# Patient Record
Sex: MALE | Race: BLACK OR AFRICAN AMERICAN | NOT HISPANIC OR LATINO | Employment: FULL TIME | URBAN - METROPOLITAN AREA
[De-identification: names, ages, dates, MRNs, and addresses within clinical notes are randomized per-mention and may not be internally consistent; named-entity substitution may affect disease eponyms.]

---

## 2018-12-10 ENCOUNTER — HOSPITAL ENCOUNTER (EMERGENCY)
Facility: CLINIC | Age: 33
Discharge: HOME | End: 2018-12-10
Attending: EMERGENCY MEDICINE | Admitting: EMERGENCY MEDICINE
Payer: COMMERCIAL

## 2018-12-10 VITALS
WEIGHT: 200.62 LBS | TEMPERATURE: 97.8 F | RESPIRATION RATE: 12 BRPM | DIASTOLIC BLOOD PRESSURE: 70 MMHG | OXYGEN SATURATION: 99 % | HEIGHT: 61 IN | BODY MASS INDEX: 37.88 KG/M2 | HEART RATE: 72 BPM | SYSTOLIC BLOOD PRESSURE: 124 MMHG

## 2018-12-10 DIAGNOSIS — R19.7 DIARRHEA OF PRESUMED INFECTIOUS ORIGIN: ICD-10-CM

## 2018-12-10 DIAGNOSIS — K92.1 BLOOD IN STOOL: ICD-10-CM

## 2018-12-10 LAB
ALBUMIN SERPL-MCNC: 3.8 G/DL (ref 3.4–5)
ALP SERPL-CCNC: 39 U/L (ref 40–150)
ALT SERPL W P-5'-P-CCNC: 45 U/L (ref 0–70)
ANION GAP SERPL CALCULATED.3IONS-SCNC: 3 MMOL/L (ref 3–14)
AST SERPL W P-5'-P-CCNC: 26 U/L (ref 0–45)
BASOPHILS # BLD AUTO: 0 10E9/L (ref 0–0.2)
BASOPHILS NFR BLD AUTO: 0.2 %
BILIRUB SERPL-MCNC: 0.4 MG/DL (ref 0.2–1.3)
BUN SERPL-MCNC: 13 MG/DL (ref 7–30)
CALCIUM SERPL-MCNC: 9.1 MG/DL (ref 8.5–10.1)
CHLORIDE SERPL-SCNC: 105 MMOL/L (ref 94–109)
CO2 SERPL-SCNC: 30 MMOL/L (ref 20–32)
CREAT SERPL-MCNC: 0.85 MG/DL (ref 0.66–1.25)
CRP SERPL-MCNC: <2.9 MG/L (ref 0–8)
DIFFERENTIAL METHOD BLD: NORMAL
EOSINOPHIL # BLD AUTO: 0.1 10E9/L (ref 0–0.7)
EOSINOPHIL NFR BLD AUTO: 2.2 %
ERYTHROCYTE [DISTWIDTH] IN BLOOD BY AUTOMATED COUNT: 13.1 % (ref 10–15)
ERYTHROCYTE [SEDIMENTATION RATE] IN BLOOD BY WESTERGREN METHOD: 8 MM/H (ref 0–15)
GFR SERPL CREATININE-BSD FRML MDRD: >90 ML/MIN/1.7M2
GLUCOSE SERPL-MCNC: 89 MG/DL (ref 70–99)
HCT VFR BLD AUTO: 40.5 % (ref 40–53)
HGB BLD-MCNC: 14 G/DL (ref 13.3–17.7)
IMM GRANULOCYTES # BLD: 0 10E9/L (ref 0–0.4)
IMM GRANULOCYTES NFR BLD: 0 %
LYMPHOCYTES # BLD AUTO: 2.5 10E9/L (ref 0.8–5.3)
LYMPHOCYTES NFR BLD AUTO: 54.9 %
MCH RBC QN AUTO: 29.5 PG (ref 26.5–33)
MCHC RBC AUTO-ENTMCNC: 34.6 G/DL (ref 31.5–36.5)
MCV RBC AUTO: 85 FL (ref 78–100)
MONOCYTES # BLD AUTO: 0.4 10E9/L (ref 0–1.3)
MONOCYTES NFR BLD AUTO: 9.3 %
NEUTROPHILS # BLD AUTO: 1.6 10E9/L (ref 1.6–8.3)
NEUTROPHILS NFR BLD AUTO: 33.4 %
NRBC # BLD AUTO: 0 10*3/UL
NRBC BLD AUTO-RTO: 0 /100
PLATELET # BLD AUTO: 274 10E9/L (ref 150–450)
POTASSIUM SERPL-SCNC: 3.9 MMOL/L (ref 3.4–5.3)
PROT SERPL-MCNC: 7.6 G/DL (ref 6.8–8.8)
RBC # BLD AUTO: 4.74 10E12/L (ref 4.4–5.9)
SODIUM SERPL-SCNC: 138 MMOL/L (ref 133–144)
WBC # BLD AUTO: 4.6 10E9/L (ref 4–11)

## 2018-12-10 PROCEDURE — 85025 COMPLETE CBC W/AUTO DIFF WBC: CPT | Performed by: EMERGENCY MEDICINE

## 2018-12-10 PROCEDURE — 25000128 H RX IP 250 OP 636: Performed by: EMERGENCY MEDICINE

## 2018-12-10 PROCEDURE — 96360 HYDRATION IV INFUSION INIT: CPT

## 2018-12-10 PROCEDURE — 80053 COMPREHEN METABOLIC PANEL: CPT | Performed by: EMERGENCY MEDICINE

## 2018-12-10 PROCEDURE — 85652 RBC SED RATE AUTOMATED: CPT | Performed by: EMERGENCY MEDICINE

## 2018-12-10 PROCEDURE — 99283 EMERGENCY DEPT VISIT LOW MDM: CPT | Mod: 25

## 2018-12-10 PROCEDURE — 86140 C-REACTIVE PROTEIN: CPT | Performed by: EMERGENCY MEDICINE

## 2018-12-10 RX ADMIN — SODIUM CHLORIDE 1000 ML: 9 INJECTION, SOLUTION INTRAVENOUS at 10:44

## 2018-12-10 SDOH — HEALTH STABILITY: MENTAL HEALTH: HOW OFTEN DO YOU HAVE A DRINK CONTAINING ALCOHOL?: NEVER

## 2018-12-10 ASSESSMENT — ENCOUNTER SYMPTOMS
VOMITING: 0
BLOOD IN STOOL: 1
CONSTIPATION: 1
DIFFICULTY URINATING: 0
HEMATURIA: 0
ABDOMINAL PAIN: 1
DIARRHEA: 0
DYSURIA: 0
FEVER: 0

## 2018-12-10 ASSESSMENT — MIFFLIN-ST. JEOR: SCORE: 1728.46

## 2018-12-10 NOTE — DISCHARGE INSTRUCTIONS
Push fluids, diet as tolerated.  Recheck in the clinic with your doctor if you have any further blood in your stool.

## 2018-12-10 NOTE — ED AVS SNAPSHOT
Emergency Department  [address] [facility] [address]  Phone:  [phone]  Fax:  [phone]                                    Wilmer Crenshaw   MRN: [id number]    Department:   Emergency Department   Date of Visit:  12/10/2018           After Visit Summary Signature Page    I have received my discharge instructions, and my questions have been answered. I have discussed any challenges I see with this plan with the nurse or doctor.    ..........................................................................................................................................  Patient/Patient Representative Signature      ..........................................................................................................................................  Patient Representative Print Name and Relationship to Patient    ..................................................               ................................................  Date                                   Time    ..........................................................................................................................................  Reviewed by Signature/Title    ...................................................              ..............................................  Date                                               Time          22EPIC Rev 08/18

## 2018-12-10 NOTE — ED NOTES
Bed: ED06  Expected date:   Expected time:   Means of arrival:   Comments:  Triage mucous stool     Maria De Jesus Sanchez RN  12/10/18 1011

## 2018-12-10 NOTE — ED PROVIDER NOTES
History     Chief Complaint:  Abdominal pain    HPI   Wilmer Crenshaw is a 33 year old male who presents to the emergency department today for evaluation of abdominal pain. Patient states he is a  and was driving back from [place] and started feeling sick Saturday morning. He endorses making a stop at a truck stop in [place] because he had extreme pain in his lower abdomen and stating that he had diarrhea. The patient states he kept on driving and had two more episode of diarrhea with the last episode having more mucous and blood in the stool. He contacted a nurse line and they informed him to go to the ER, however he kept driving until he made it back to [place]. Yesterday, he endorses being home all day and states he now has constipation and slight pain in the abdomen, however he states he is feeling much better. Patient denies current diarrhea, fever, history of abdominal problems, difficulty urinating, dysuria, hematuria, or been around close contact illnesses.    Allergies:  No Known Drug Allergies    Medications:    Medications reviewed. No current medications.     Past Medical History:    Medical history reviewed. No pertinent medical history.    Past Surgical History:    Surgical history reviewed. No pertinent surgical history.    Family History:    Family history reviewed. No pertinent family history.     Social History:  The patient was accompanied to the ED by himself.  Smoking Status: Never Smoker  Smokeless Tobacco: Never Used  Alcohol Use: negative  Marital Status:  Single     Review of Systems   Constitutional: Negative for fever.   Gastrointestinal: Positive for abdominal pain, blood in stool and constipation. Negative for diarrhea and vomiting.   Genitourinary: Negative for difficulty urinating, dysuria and hematuria.   All other systems reviewed and are negative.      Physical Exam     Patient Vitals for the past 24 hrs:   BP Temp Temp src Pulse Heart Rate Resp SpO2 Height Weight   12/10/18  "1216 124/70 -- -- -- 70 12 99 % -- --   12/10/18 1018 129/87 97.8  F (36.6  C) Oral 72 -- 16 100 % 1.558 m (5' 1.32\") 91 kg (200 lb 9.9 oz)       Physical Exam  Nursing note and vitals reviewed.    Constitutional:  Appears well-developed and well-nourished, comfortable.    HENT:    Nose normal.  No discharge.      Oropharynx is clear and moist.  Eyes:    Conjunctivae are normal without injection. No lid droop.     No scleral icterus.  Lymph:  No enlarged or tender cervical or submandibular lymph nodes.   Cardiovascular:  Normal rate, regular rhythm with normal S1 and S2.      Normal heart sounds.       No murmur or stella.  Pulmonary:  Effort normal and breath sounds clear to auscultation bilaterally. No respiratory distress.  No stridor.     No wheezes. No rales.     GI:    Soft. No distension and no mass.  Mild left lower quadrant tenderness without rebound or guarding.     No flank pain.  No HSM.  Musculoskeletal:  Normal range of motion. No extremity deformity.     No edema and no tenderness.    Neurological:   Alert and oriented. No cranial nerve deficit, no facial droop.     Exhibits good muscle tone. Coordination normal.   Skin:    Skin is warm and dry. No rash noted. No diaphoresis.      No erythema. No pallor.  No lesions.  Psychiatric:   Behavior is normal. Appropriate mood and affect.     Judgment and thought content normal.     Emergency Department Course     Laboratory:  Laboratory findings were communicated with the patient who voiced understanding of the findings.    CRP inflammation: <2.9  ESR: 8  CBC: WBC 4.6, HGB 14.0,   CMP: alkaline phos 39(L) o/w WNL (Creatinine 0.85)    Interventions:  1044 NS 1000ml IV    Emergency Department Course:    1017 Nursing notes and vitals reviewed.    1025 I performed an exam of the patient as documented above.     1042 IV was inserted and blood was drawn for laboratory testing, results above.    1200 Recheck and update.    1228 I personally reviewed the lab " results with the patient and answered all related questions prior to discharge.    Impression & Plan      Medical Decision Making:  Wilmer Crenshaw is a 33 year old male who presents to the emergency department today for evaluation of abdominal pain. Patient comes in after having diarrhea for 2 days and some bloody mucus.  It has all resolved in the last 2 days and is feeling much better.  Laboratory work here today is normal including a white count C-reactive protein sed rate and electrolytes.  I believe he had a viral colitis and it has resolved.  If he notices further blood in his stool he knows to follow-up in the clinic for colonoscopy.  Otherwise follow-up as needed.    Diagnosis:    ICD-10-CM    1. Diarrhea of presumed infectious origin R19.7    2. Blood in stool K92.1     Likely a colitis, resolved     Disposition:   The patient is discharged to home. Push fluids, diet as tolerated.  Recheck in the clinic with your doctor if you have any further blood in your stool.    Discharge Medications:  No discharge medications.    Scribe Disclosure:  I, Lulu Truong, am serving as a scribe at 1:23 PM on 12/10/2018 to document services personally performed by No att. providers found based on my observations and the provider's statements to me.      EMERGENCY DEPARTMENT       Caroline Gibson MD  12/10/18 6736

## 2019-01-20 ENCOUNTER — HOSPITAL ENCOUNTER (EMERGENCY)
Facility: CLINIC | Age: 34
Discharge: HOME | End: 2019-01-20
Attending: EMERGENCY MEDICINE | Admitting: EMERGENCY MEDICINE
Payer: COMMERCIAL

## 2019-01-20 ENCOUNTER — APPOINTMENT (OUTPATIENT)
Dept: CT IMAGING | Facility: CLINIC | Age: 34
End: 2019-01-20
Payer: COMMERCIAL

## 2019-01-20 VITALS
DIASTOLIC BLOOD PRESSURE: 66 MMHG | SYSTOLIC BLOOD PRESSURE: 128 MMHG | OXYGEN SATURATION: 99 % | HEART RATE: 89 BPM | TEMPERATURE: 98.7 F | RESPIRATION RATE: 12 BRPM

## 2019-01-20 DIAGNOSIS — B34.9 VIRAL SYNDROME: ICD-10-CM

## 2019-01-20 LAB
ALBUMIN SERPL-MCNC: 3.7 G/DL (ref 3.4–5)
ALBUMIN UR-MCNC: NEGATIVE MG/DL
ALP SERPL-CCNC: 42 U/L (ref 40–150)
ALT SERPL W P-5'-P-CCNC: 38 U/L (ref 0–70)
ANION GAP SERPL CALCULATED.3IONS-SCNC: 8 MMOL/L (ref 3–14)
APPEARANCE UR: CLEAR
AST SERPL W P-5'-P-CCNC: 25 U/L (ref 0–45)
BASOPHILS # BLD AUTO: 0 10E9/L (ref 0–0.2)
BASOPHILS NFR BLD AUTO: 0.1 %
BILIRUB SERPL-MCNC: 0.4 MG/DL (ref 0.2–1.3)
BILIRUB UR QL STRIP: NEGATIVE
BUN SERPL-MCNC: 12 MG/DL (ref 7–30)
CALCIUM SERPL-MCNC: 9 MG/DL (ref 8.5–10.1)
CHLORIDE SERPL-SCNC: 103 MMOL/L (ref 94–109)
CO2 SERPL-SCNC: 26 MMOL/L (ref 20–32)
COLOR UR AUTO: YELLOW
CREAT SERPL-MCNC: 1.11 MG/DL (ref 0.66–1.25)
DIFFERENTIAL METHOD BLD: NORMAL
EOSINOPHIL # BLD AUTO: 0 10E9/L (ref 0–0.7)
EOSINOPHIL NFR BLD AUTO: 0.1 %
ERYTHROCYTE [DISTWIDTH] IN BLOOD BY AUTOMATED COUNT: 13.3 % (ref 10–15)
GFR SERPL CREATININE-BSD FRML MDRD: 87 ML/MIN/{1.73_M2}
GLUCOSE SERPL-MCNC: 96 MG/DL (ref 70–99)
GLUCOSE UR STRIP-MCNC: NEGATIVE MG/DL
HCT VFR BLD AUTO: 40.6 % (ref 40–53)
HGB BLD-MCNC: 14.1 G/DL (ref 13.3–17.7)
HGB UR QL STRIP: NEGATIVE
IMM GRANULOCYTES # BLD: 0 10E9/L (ref 0–0.4)
IMM GRANULOCYTES NFR BLD: 0.4 %
KETONES UR STRIP-MCNC: NEGATIVE MG/DL
LEUKOCYTE ESTERASE UR QL STRIP: NEGATIVE
LIPASE SERPL-CCNC: 105 U/L (ref 73–393)
LYMPHOCYTES # BLD AUTO: 1.5 10E9/L (ref 0.8–5.3)
LYMPHOCYTES NFR BLD AUTO: 20.2 %
MCH RBC QN AUTO: 29.5 PG (ref 26.5–33)
MCHC RBC AUTO-ENTMCNC: 34.7 G/DL (ref 31.5–36.5)
MCV RBC AUTO: 85 FL (ref 78–100)
MONOCYTES # BLD AUTO: 0.5 10E9/L (ref 0–1.3)
MONOCYTES NFR BLD AUTO: 6.2 %
MUCOUS THREADS #/AREA URNS LPF: PRESENT /LPF
NEUTROPHILS # BLD AUTO: 5.4 10E9/L (ref 1.6–8.3)
NEUTROPHILS NFR BLD AUTO: 73 %
NITRATE UR QL: NEGATIVE
NRBC # BLD AUTO: 0 10*3/UL
NRBC BLD AUTO-RTO: 0 /100
PH UR STRIP: 5.5 PH (ref 5–7)
PLATELET # BLD AUTO: 243 10E9/L (ref 150–450)
POTASSIUM SERPL-SCNC: 3.8 MMOL/L (ref 3.4–5.3)
PROT SERPL-MCNC: 7.7 G/DL (ref 6.8–8.8)
RBC # BLD AUTO: 4.78 10E12/L (ref 4.4–5.9)
RBC #/AREA URNS AUTO: <1 /HPF (ref 0–2)
SODIUM SERPL-SCNC: 137 MMOL/L (ref 133–144)
SOURCE: ABNORMAL
SP GR UR STRIP: 1.01 (ref 1–1.03)
UROBILINOGEN UR STRIP-MCNC: NORMAL MG/DL (ref 0–2)
WBC # BLD AUTO: 7.4 10E9/L (ref 4–11)
WBC #/AREA URNS AUTO: 1 /HPF (ref 0–5)

## 2019-01-20 PROCEDURE — 96361 HYDRATE IV INFUSION ADD-ON: CPT

## 2019-01-20 PROCEDURE — 80053 COMPREHEN METABOLIC PANEL: CPT | Performed by: EMERGENCY MEDICINE

## 2019-01-20 PROCEDURE — 83690 ASSAY OF LIPASE: CPT | Performed by: EMERGENCY MEDICINE

## 2019-01-20 PROCEDURE — 96374 THER/PROPH/DIAG INJ IV PUSH: CPT

## 2019-01-20 PROCEDURE — 81001 URINALYSIS AUTO W/SCOPE: CPT | Performed by: EMERGENCY MEDICINE

## 2019-01-20 PROCEDURE — 25000125 ZZHC RX 250: Performed by: EMERGENCY MEDICINE

## 2019-01-20 PROCEDURE — 85025 COMPLETE CBC W/AUTO DIFF WBC: CPT | Performed by: EMERGENCY MEDICINE

## 2019-01-20 PROCEDURE — 99285 EMERGENCY DEPT VISIT HI MDM: CPT | Mod: 25

## 2019-01-20 PROCEDURE — 25000128 H RX IP 250 OP 636: Performed by: EMERGENCY MEDICINE

## 2019-01-20 PROCEDURE — 74177 CT ABD & PELVIS W/CONTRAST: CPT

## 2019-01-20 RX ORDER — KETOROLAC TROMETHAMINE 30 MG/ML
30 INJECTION, SOLUTION INTRAMUSCULAR; INTRAVENOUS ONCE
Status: COMPLETED | OUTPATIENT
Start: 2019-01-20 | End: 2019-01-20

## 2019-01-20 RX ORDER — IOPAMIDOL 755 MG/ML
101 INJECTION, SOLUTION INTRAVASCULAR ONCE
Status: COMPLETED | OUTPATIENT
Start: 2019-01-20 | End: 2019-01-20

## 2019-01-20 RX ORDER — SODIUM CHLORIDE 9 MG/ML
INJECTION, SOLUTION INTRAVENOUS CONTINUOUS
Status: DISCONTINUED | OUTPATIENT
Start: 2019-01-20 | End: 2019-01-20 | Stop reason: HOSPADM

## 2019-01-20 RX ADMIN — SODIUM CHLORIDE 1000 ML: 9 INJECTION, SOLUTION INTRAVENOUS at 08:25

## 2019-01-20 RX ADMIN — SODIUM CHLORIDE 1000 ML: 9 INJECTION, SOLUTION INTRAVENOUS at 08:26

## 2019-01-20 RX ADMIN — IOPAMIDOL 101 ML: 755 INJECTION, SOLUTION INTRAVENOUS at 10:36

## 2019-01-20 RX ADMIN — KETOROLAC TROMETHAMINE 30 MG: 30 INJECTION, SOLUTION INTRAMUSCULAR at 11:36

## 2019-01-20 RX ADMIN — SODIUM CHLORIDE 71 ML: 9 INJECTION, SOLUTION INTRAVENOUS at 10:37

## 2019-01-20 ASSESSMENT — ENCOUNTER SYMPTOMS
SORE THROAT: 0
RHINORRHEA: 0
FREQUENCY: 0
DYSURIA: 0
NAUSEA: 1
HEADACHES: 0
COUGH: 0
APPETITE CHANGE: 1
MYALGIAS: 1
FEVER: 1
ABDOMINAL PAIN: 1

## 2019-01-20 NOTE — ED AVS SNAPSHOT
Emergency Department  [address] [facility] [address]  Phone:  [phone]  Fax:  [phone]                                    Wilmer Crenshaw   MRN: [id number]    Department:   Emergency Department   Date of Visit:  1/20/2019           After Visit Summary Signature Page    I have received my discharge instructions, and my questions have been answered. I have discussed any challenges I see with this plan with the nurse or doctor.    ..........................................................................................................................................  Patient/Patient Representative Signature      ..........................................................................................................................................  Patient Representative Print Name and Relationship to Patient    ..................................................               ................................................  Date                                   Time    ..........................................................................................................................................  Reviewed by Signature/Title    ...................................................              ..............................................  Date                                               Time          22EPIC Rev 08/18

## 2019-01-20 NOTE — ED PROVIDER NOTES
History     Chief Complaint:  Abdominal pain     HPI:   The history is provided by the patient.      Wilmer Crenshaw is a 34 year old male who presents to the emergency department for evaluation of abdominal pain. The patient reports that he developed swollen lymph nodes last week that have gradually decreased in size and tenderness. Yesterday afternoon, he developed body aches and the sensation of a fever (though no recorded temp). He then developed diffuse abdominal pain with bowel movements as well as nausea and rib pain. He took 2 Ibuprofen around 1700 yesterday. He had no appetite but was able to force himself to eat. He presents to the emergency department concerned for his symptoms. Here, the patient rates his abdominal pain a 6/10 in severity. He does not currently have nausea. He denies any dysuria, frequency, rashes, cough, runny nose, sore throat, leg swelling, ear pain, headache, or visual disturbance. He does note that a friend that he was recently with is also no feeling well. He has had no recent travel outside the country, though does note that he is a . He has no medical or surgical history.     Allergies:  No known drug allergies.    Medications:    The patient is not currently taking any prescribed medications.     Past Medical History:    Denies any medical history     Past Surgical History:    Denies any surgical history     Family History:    Noncontributory     Social History:  Presents alone    Tobacco use: Never smoker   Alcohol use: No  Marital status: Single      Review of Systems   Constitutional: Positive for appetite change and fever (subjective).   HENT: Negative for ear pain, rhinorrhea and sore throat.    Eyes: Negative for visual disturbance.   Respiratory: Negative for cough.    Cardiovascular: Negative for leg swelling.   Gastrointestinal: Positive for abdominal pain and nausea (resolved).   Genitourinary: Negative for dysuria and frequency.   Musculoskeletal: Positive  for myalgias.   Skin: Negative for rash.   Neurological: Negative for headaches.   All other systems reviewed and are negative.      Physical Exam     Patient Vitals for the past 24 hrs:   BP Temp Temp src Pulse Resp SpO2   01/20/19 1119 -- 98.7  F (37.1  C) Oral -- 12 --   01/20/19 1117 128/66 -- -- 89 -- --   01/20/19 0918 -- 100.3  F (37.9  C) Oral -- -- --   01/20/19 0747 119/67 -- -- 117 16 99 %   01/20/19 0745 -- 99.2  F (37.3  C) Oral -- -- --        Physical Exam  Constitutional: Well developed, nontox appearance  Head: Atraumatic.   Mouth/Throat: Oropharynx is clear and moist.   Neck:  no stridor, full ROM, no LAD, no meningismus  Eyes: no scleral icterus  Cardiovascular: Regular tachycardia, 2+ bilat radial pulses  Pulmonary/Chest: nml resp effort, Clear BS bilat  Abdominal: ND, +BS, soft, LLQ, no rebound or guarding   : no CVA tenderness bilat  Ext: Warm, well perfused, no edema  Neurological: A&O, symmetric facies, moves ext x4  Skin: Skin is warm and dry.   Psychiatric: Behavior is normal. Thought content normal.   Nursing note and vitals reviewed.    Emergency Department Course     Imaging:  Radiographic findings were communicated with the patient who voiced understanding of the findings.     CT Abd/pelvis with contrast:  IMPRESSION: Diffuse colonic wall prominence. This could be related to  relative decompression. However, although there is no pericolonic  inflammatory stranding, diffuse colitis cannot be excluded.    Imaging independently reviewed and agree with radiologist interpretation.      Laboratory:  I personally reviewed the laboratory results with the Patient and answered all related questions prior to discharge.  CBC: WNL (WBC 7.4, HGB 14.1, )   CMP: (Creatinine 1.11)   Lipase: 105   UA with Microscopic: Mucous present, ow Negative     Interventions:  0826: NS 1L IV Bolus    1136: Toradol 30 mg IV       Emergency Department Course:  Past medical records, nursing notes, and vitals  reviewed.  0748: I performed an exam of the patient and obtained history, as documented above.     IV inserted and blood drawn. This was sent to the lab for further testing, results above.   Above interventions provided.      The patient was sent for a CT while in the emergency department, findings above.      1148: I rechecked the patient. Findings and plan explained to the Patient. Patient discharged home with instructions regarding supportive care, medications, and reasons to return. The importance of close follow-up was reviewed.       Impression & Plan      Medical Decision Makin year old male presenting w/ body aches, resolved lymphadenopathy, transient nausea, left lower quadrant tenderness     DDx includes viral syndrome, diverticulitis, colitis, constipation, electrolyte abnormality.  Doubt meningitis, bowel obstruction, urinary tract infection given symptomatology, physical exam, history.  Labs and imaging ordered as noted above.  Labs unremarkable.  Imaging sig for colonic wall prominence.  Medications given as noted above with improvement in symptoms and resolution of tachycardia.  Pt likely experiencing viral syndrome.  At this time I feel he is safe for discharge.  Recommendations given regarding follow up with primary care doctor and return to the emergency department as needed for new or worsening symptoms.  Counseled on all results, disposition and diagnosis.  Patient understanding and agreeable to plan. Patient discharged in stable condition.    Diagnosis:    ICD-10-CM    1. Viral syndrome B34.9        Disposition:  Discharged to home with plan as outlined.     Scribe Disclosure:   Perry GREENWOOD, am serving as a scribe at 7:48 AM on 2019 to document services personally performed by Edgardo Stark MD based on my observations and the provider's statements to me.       Edgardo Stark MD  2019    EMERGENCY DEPARTMENT       Edgardo Stark MD  19  0056

## 2019-01-20 NOTE — DISCHARGE INSTRUCTIONS
1. -Take acetaminophen 500 to 1000 mg by mouth every 4 to 6 hours as needed for pain or fever.  Do not take more than 4000 mg in 24 hours.  Do not take within 6 hours of another acetaminophen containing medication such as norco (vicodin) or percocet.  - Take ibuprofen 600 to 800 mg by mouth every 6 to 8 hours as needed for pain or fever  2.  Please drink plenty of fluids.  3.  Please follow-up with your primary doctor as needed.  4.  Please return to the emergency department as needed for new or worsening symptoms including vomiting and unable to keep anything down, severe and uncontrollable pain, severe confusion, severe neck pain and stiffness, shortness of breath, fainting, any other concerning symptoms.

## 2019-01-23 ENCOUNTER — NURSE TRIAGE (OUTPATIENT)
Dept: NURSING | Facility: CLINIC | Age: 34
End: 2019-01-23

## 2019-01-23 NOTE — TELEPHONE ENCOUNTER
Soft transfer to central scheduling to set up appointment for tomorrow at [facility].  [facility]

## 2019-01-24 ENCOUNTER — OFFICE VISIT (OUTPATIENT)
Dept: FAMILY MEDICINE | Facility: CLINIC | Age: 34
End: 2019-01-24
Payer: COMMERCIAL

## 2019-01-24 VITALS
HEART RATE: 73 BPM | BODY MASS INDEX: 37 KG/M2 | WEIGHT: 196 LBS | HEIGHT: 61 IN | SYSTOLIC BLOOD PRESSURE: 108 MMHG | OXYGEN SATURATION: 97 % | TEMPERATURE: 97.1 F | DIASTOLIC BLOOD PRESSURE: 73 MMHG

## 2019-01-24 DIAGNOSIS — K29.50 CHRONIC GASTRITIS WITHOUT BLEEDING, UNSPECIFIED GASTRITIS TYPE: ICD-10-CM

## 2019-01-24 DIAGNOSIS — R10.84 ABDOMINAL PAIN, GENERALIZED: Primary | ICD-10-CM

## 2019-01-24 DIAGNOSIS — K52.9 GASTROENTERITIS: ICD-10-CM

## 2019-01-24 PROCEDURE — 99204 OFFICE O/P NEW MOD 45 MIN: CPT | Performed by: INTERNAL MEDICINE

## 2019-01-24 ASSESSMENT — MIFFLIN-ST. JEOR: SCORE: 1702.51

## 2019-01-24 NOTE — PROGRESS NOTES
SUBJECTIVE:   Wilmer Crenshaw is a 34 year old male who presents to clinic today for the following health issues:      New Patient/Transfer of Care    ED/UC Followup:    Facility:   Emergency Department  Date of visit: 01/20/2019  Reason for visit: Viral syndrome, Abdominal Pain  Current Status: Patient state that he still having abdominal pain off and on.         HPI:   Patient Wilmer Crenshaw is a very pleasant 34 year old male with history of gastritis who presents to Internal Medicine clinic today for evaluation of recent ER visit of diffuse abdominal pains and gastroenteritis symptoms. Regarding the patient's ER evaluation, the patient's WBC was negative for leukocytosis.  His abdominal CT imaging study obtained at the ER shows no appendicitis or gallstones. The CT scan did show Diffuse colonic wall prominence. This could be related to relative decompression. However, although there is no pericolonic inflammatory stranding, diffuse colitis. Regarding the patient's chronic gastritis, the patient is not on any anti-acid medication at this time. He is concerned about possible H. Pylori infection of the stomach. He denies any acute fever or chills at this time. He still have some occasional diarrhea, loose stools since ER discharge.         Current Medications:     No current outpatient medications on file.         Allergies:      Allergies   Allergen Reactions     Seasonal Allergies             Past Medical History:     Past Medical History:   Diagnosis Date     Gastritis          Past Surgical History:   No past surgical history on file.      Family Medical History:     Family History   Problem Relation Age of Onset     Family History Negative Mother      Family History Negative Father          Social History:     Social History     Socioeconomic History     Marital status: Single     Spouse name: Not on file     Number of children: Not on file     Years of education: Not on file     Highest education level: Not on  "file   Social Needs     Financial resource strain: Not on file     Food insecurity - worry: Not on file     Food insecurity - inability: Not on file     Transportation needs - medical: Not on file     Transportation needs - non-medical: Not on file   Occupational History     Not on file   Tobacco Use     Smoking status: Never Smoker     Smokeless tobacco: Never Used   Substance and Sexual Activity     Alcohol use: No     Frequency: Never     Drug use: No     Sexual activity: Yes     Partners: Female   Other Topics Concern     Not on file   Social History Narrative     Not on file           Review of System:     Constitutional: Negative for fever or chills  Skin: Negative for rashes  Ears/Nose/Throat: Negative for nasal congestion, sore throat  Respiratory: No shortness of breath, dyspnea on exertion, cough, or hemoptysis  Cardiovascular: Negative for chest pain  Gastrointestinal: Positive for abdominal pains, diarrhea, gastritis  Genitourinary: Negative for dysuria, hematuria  Musculoskeletal: Negative for myalgias  Neurologic: Negative for headaches  Psychiatric: Negative for depression, anxiety  Hematologic/Lymphatic/Immunologic: Negative  Endocrine: Negative  Behavioral: Negative for tobacco use       Physical Exam:   /73 (BP Location: Left arm, Patient Position: Sitting, Cuff Size: Adult Large)   Pulse 73   Temp 97.1  F (36.2  C) (Oral)   Ht 1.558 m (5' 1.32\")   Wt 88.9 kg (196 lb)   SpO2 97%   BMI 36.65 kg/m      GENERAL: alert and no distress  EYES: eyes grossly normal to inspection, and conjunctivae and sclerae normal  HENT: Normocephalic atraumatic. Nose and mouth without ulcers or lesions  NECK: supple  RESP: lungs clear to auscultation   CV: regular rate and rhythm, normal S1 S2  LYMPH: no peripheral edema   ABDOMEN: nondistended, non-tender to palpation, no rebound or guarding  MS: no gross musculoskeletal defects noted  SKIN: no suspicious lesions or rashes  NEURO: Alert & Oriented x 3. "   PSYCH: mentation appears normal, affect normal        Diagnostic Test Results:     Diagnostic Test Results:  Results for orders placed or performed during the hospital encounter of 01/20/19   CT Abdomen Pelvis w Contrast    Narrative    CT ABDOMEN AND PELVIS WITH CONTRAST 1/20/2019 10:38 AM     HISTORY: Focal left lower quadrant tenderness and tachy, evaluate  diverticulitis, other.    CONTRAST DOSE:  101 mL Isovue-370    Radiation dose for this scan was reduced using automated exposure  control, adjustment of the mA and/or kV according to patient size, or  iterative reconstruction technique.    FINDINGS:  The colonic wall is diffusely prominent from the cecum to  the rectum. The colon is diffusely decompressed. No pericolonic  inflammatory stranding is noted. There is no evidence of bowel  obstruction. A normal appendix is noted. There is no free peritoneal  fluid or air. The liver, spleen, adrenal glands, kidneys, pancreas,  and gallbladder appear within normal limits. Pelvic contents are  otherwise unremarkable.      Impression    IMPRESSION: Diffuse colonic wall prominence. This could be related to  relative decompression. However, although there is no pericolonic  inflammatory stranding, diffuse colitis cannot be excluded.    ARIC PAIGE MD   CBC with platelets differential   Result Value Ref Range    WBC 7.4 4.0 - 11.0 10e9/L    RBC Count 4.78 4.4 - 5.9 10e12/L    Hemoglobin 14.1 13.3 - 17.7 g/dL    Hematocrit 40.6 40.0 - 53.0 %    MCV 85 78 - 100 fl    MCH 29.5 26.5 - 33.0 pg    MCHC 34.7 31.5 - 36.5 g/dL    RDW 13.3 10.0 - 15.0 %    Platelet Count 243 150 - 450 10e9/L    Diff Method Automated Method     % Neutrophils 73.0 %    % Lymphocytes 20.2 %    % Monocytes 6.2 %    % Eosinophils 0.1 %    % Basophils 0.1 %    % Immature Granulocytes 0.4 %    Nucleated RBCs 0 0 /100    Absolute Neutrophil 5.4 1.6 - 8.3 10e9/L    Absolute Lymphocytes 1.5 0.8 - 5.3 10e9/L    Absolute Monocytes 0.5 0.0 - 1.3 10e9/L     Absolute Eosinophils 0.0 0.0 - 0.7 10e9/L    Absolute Basophils 0.0 0.0 - 0.2 10e9/L    Abs Immature Granulocytes 0.0 0 - 0.4 10e9/L    Absolute Nucleated RBC 0.0    Comprehensive metabolic panel   Result Value Ref Range    Sodium 137 133 - 144 mmol/L    Potassium 3.8 3.4 - 5.3 mmol/L    Chloride 103 94 - 109 mmol/L    Carbon Dioxide 26 20 - 32 mmol/L    Anion Gap 8 3 - 14 mmol/L    Glucose 96 70 - 99 mg/dL    Urea Nitrogen 12 7 - 30 mg/dL    Creatinine 1.11 0.66 - 1.25 mg/dL    GFR Estimate 87 >60 mL/min/[1.73_m2]    GFR Estimate If Black >90 >60 mL/min/[1.73_m2]    Calcium 9.0 8.5 - 10.1 mg/dL    Bilirubin Total 0.4 0.2 - 1.3 mg/dL    Albumin 3.7 3.4 - 5.0 g/dL    Protein Total 7.7 6.8 - 8.8 g/dL    Alkaline Phosphatase 42 40 - 150 U/L    ALT 38 0 - 70 U/L    AST 25 0 - 45 U/L   UA with Microscopic   Result Value Ref Range    Color Urine Yellow     Appearance Urine Clear     Glucose Urine Negative NEG^Negative mg/dL    Bilirubin Urine Negative NEG^Negative    Ketones Urine Negative NEG^Negative mg/dL    Specific Gravity Urine 1.010 1.003 - 1.035    Blood Urine Negative NEG^Negative    pH Urine 5.5 5.0 - 7.0 pH    Protein Albumin Urine Negative NEG^Negative mg/dL    Urobilinogen mg/dL Normal 0.0 - 2.0 mg/dL    Nitrite Urine Negative NEG^Negative    Leukocyte Esterase Urine Negative NEG^Negative    Source Midstream Urine     WBC Urine 1 0 - 5 /HPF    RBC Urine <1 0 - 2 /HPF    Mucous Urine Present (A) NEG^Negative /LPF   Lipase   Result Value Ref Range    Lipase 105 73 - 393 U/L       ASSESSMENT/PLAN:     (K29.50) Chronic gastritis without bleeding, unspecified gastritis type  (K52.9) Gastroenteritis  (R10.84) Abdominal pain, generalized  (primary encounter diagnosis)  Comment: post ER discharge follow up of recent gastroenteritis. Symptoms concerning for possible crohn's disease, ulcerative colitis vs. H. Pylori infection. No bloody stools at this time.  Plan: I have ordered diagnostic upper EGD endoscopy and  colonoscopy with GASTROENTEROLOGY ADULT REF PROCEDURE ONLY         Carlos Frazier [phone]; No Provider Preference, I have also prescribed ranitidine (ZANTAC) 150 MG capsule 2xDay for gastritis treatment going forward.    Follow Up Plan:     Patient is instructed to return to Internal Medicine clinic for follow-up visit in 1 week.        Kylah Sol MD  Internal Medicine  [facility]

## 2019-01-28 ENCOUNTER — HOSPITAL ENCOUNTER (OUTPATIENT)
Facility: CLINIC | Age: 34
End: 2019-01-28
Attending: INTERNAL MEDICINE | Admitting: INTERNAL MEDICINE

## 2019-06-13 ENCOUNTER — OFFICE VISIT (OUTPATIENT)
Dept: FAMILY MEDICINE | Facility: CLINIC | Age: 34
End: 2019-06-13
Payer: COMMERCIAL

## 2019-06-13 VITALS
DIASTOLIC BLOOD PRESSURE: 80 MMHG | HEART RATE: 77 BPM | SYSTOLIC BLOOD PRESSURE: 124 MMHG | HEIGHT: 72 IN | TEMPERATURE: 97.4 F | BODY MASS INDEX: 26.55 KG/M2 | WEIGHT: 196 LBS | OXYGEN SATURATION: 98 %

## 2019-06-13 DIAGNOSIS — Z00.00 ROUTINE GENERAL MEDICAL EXAMINATION AT A HEALTH CARE FACILITY: Primary | ICD-10-CM

## 2019-06-13 DIAGNOSIS — Z13.6 CARDIOVASCULAR SCREENING; LDL GOAL LESS THAN 160: ICD-10-CM

## 2019-06-13 DIAGNOSIS — Z11.3 SCREEN FOR STD (SEXUALLY TRANSMITTED DISEASE): ICD-10-CM

## 2019-06-13 DIAGNOSIS — J34.2 DEVIATED NASAL SEPTUM: ICD-10-CM

## 2019-06-13 PROCEDURE — 99395 PREV VISIT EST AGE 18-39: CPT | Performed by: FAMILY MEDICINE

## 2019-06-13 ASSESSMENT — MIFFLIN-ST. JEOR: SCORE: 1866.56

## 2019-06-13 NOTE — PROGRESS NOTES
SUBJECTIVE:   CC: Wilmer Crenshaw is an 34 year old male who presents for preventive health visit.     Healthy Habits:    Do you get at least three servings of calcium containing foods daily (dairy, green leafy vegetables, etc.)? NO    Amount of exercise or daily activities, outside of work: NONE    Problems taking medications regularly not applicable    Medication side effects: No    Have you had an eye exam in the past two years? no    Do you see a dentist twice per year? yes    Do you have sleep apnea, excessive snoring or daytime drowsiness?no      Patient overall very healthy.  Denies any chest pain shortness of breath.  Currently working as a .  Complains of on and off nasal congestion symptoms, had some trauma while he was child playing outside.    Today's PHQ-2 Score:   PHQ-2 ( 1999 [employer]) 6/13/2019 1/24/2019   Q1: Little interest or pleasure in doing things 0 0   Q2: Feeling down, depressed or hopeless 0 0   PHQ-2 Score 0 0       Abuse: Current or Past(Physical, Sexual or Emotional)- No  Do you feel safe in your environment? Yes    Social History     Tobacco Use     Smoking status: Never Smoker     Smokeless tobacco: Never Used   Substance Use Topics     Alcohol use: No     Frequency: Never     If you drink alcohol do you typically have >3 drinks per day or >7 drinks per week? No                      Last PSA: No results found for: PSA    Reviewed orders with patient. Reviewed health maintenance and updated orders accordingly - Yes  Lab work is in process  Labs reviewed in EPIC    Reviewed and updated as needed this visit by clinical staff  Tobacco  Allergies  Meds  Fam Hx  Soc Hx        Reviewed and updated as needed this visit by Provider            ROS:  CONSTITUTIONAL: NEGATIVE for fever, chills, change in weight  INTEGUMENTARY/SKIN: NEGATIVE for worrisome rashes, moles or lesions  EYES: NEGATIVE for vision changes or irritation  ENT: NEGATIVE for ear, mouth and throat problems  RESP:  "NEGATIVE for significant cough or SOB  CV: NEGATIVE for chest pain, palpitations or peripheral edema  GI: NEGATIVE for nausea, abdominal pain, heartburn, or change in bowel habits   male: negative for dysuria, hematuria, decreased urinary stream, erectile dysfunction, urethral discharge  MUSCULOSKELETAL: NEGATIVE for significant arthralgias or myalgia  NEURO: NEGATIVE for weakness, dizziness or paresthesias  PSYCHIATRIC: NEGATIVE for changes in mood or affect    OBJECTIVE:   /80   Pulse 77   Temp 97.4  F (36.3  C) (Tympanic)   Ht 1.82 m (5' 11.65\")   Wt 88.9 kg (196 lb)   SpO2 98%   BMI 26.84 kg/m    EXAM:  GENERAL: healthy, alert and no distress  EYES: Eyes grossly normal to inspection, PERRL and conjunctivae and sclerae normal  HENT: ear canals and TM's normal, nose and mouth without ulcers or lesions  NECK: no adenopathy, no asymmetry, masses, or scars and thyroid normal to palpation  RESP: lungs clear to auscultation - no rales, rhonchi or wheezes  CV: regular rate and rhythm, normal S1 S2, no S3 or S4, no murmur, click or rub, no peripheral edema and peripheral pulses strong  ABDOMEN: soft, nontender, no hepatosplenomegaly, no masses and bowel sounds normal  MS: no gross musculoskeletal defects noted, no edema  SKIN: no suspicious lesions or rashes  NEURO: Normal strength and tone, mentation intact and speech normal  PSYCH: mentation appears normal, affect normal/bright    Diagnostic Test Results:  Labs reviewed in Epic    ASSESSMENT/PLAN:   1. Routine general medical examination at a health care facility    - HIV Screening; Future  - Lipid panel reflex to direct LDL Fasting; Future  - Comprehensive metabolic panel; Future    2. CARDIOVASCULAR SCREENING; LDL GOAL LESS THAN 160  id panel reflex to direct LDL Fasting; Future  - Comprehensive metabolic panel; Future    3. Deviated nasal septum  Patient most likely has deviated nasal septum.  Suggested ENT evaluation to see if any surgery needs to be " "done.  He was told in the past at [facility], he may need surgery in future.  - OTOLARYNGOLOGY REFERRAL    4. Screen for STD (sexually transmitted disease)    - NEISSERIA GONORRHOEA PCR  - CHLAMYDIA TRACHOMATIS PCR    COUNSELING:  Reviewed preventive health counseling, as reflected in patient instructions       Regular exercise       Healthy diet/nutrition    Estimated body mass index is 26.84 kg/m  as calculated from the following:    Height as of this encounter: 1.82 m (5' 11.65\").    Weight as of this encounter: 88.9 kg (196 lb).         reports that he has never smoked. He has never used smokeless tobacco.      Counseling Resources:  ATP IV Guidelines  Pooled Cohorts Equation Calculator  FRAX Risk Assessment  ICSI Preventive Guidelines  Dietary Guidelines for Americans, 2010  USDA's MyPlate  ASA Prophylaxis  Lung CA Screening    Phillip Harper MD  [facility] [place]  "

## 2019-06-14 DIAGNOSIS — Z00.00 ROUTINE GENERAL MEDICAL EXAMINATION AT A HEALTH CARE FACILITY: ICD-10-CM

## 2019-06-14 DIAGNOSIS — Z13.6 CARDIOVASCULAR SCREENING; LDL GOAL LESS THAN 160: ICD-10-CM

## 2019-06-14 DIAGNOSIS — Z11.3 SCREEN FOR STD (SEXUALLY TRANSMITTED DISEASE): ICD-10-CM

## 2019-06-14 LAB
ALBUMIN SERPL-MCNC: 4.2 G/DL (ref 3.4–5)
ALP SERPL-CCNC: 57 U/L (ref 40–150)
ALT SERPL W P-5'-P-CCNC: 49 U/L (ref 0–70)
ANION GAP SERPL CALCULATED.3IONS-SCNC: 7 MMOL/L (ref 3–14)
AST SERPL W P-5'-P-CCNC: 31 U/L (ref 0–45)
BILIRUB SERPL-MCNC: 0.2 MG/DL (ref 0.2–1.3)
BUN SERPL-MCNC: 11 MG/DL (ref 7–30)
CALCIUM SERPL-MCNC: 9.2 MG/DL (ref 8.5–10.1)
CHLORIDE SERPL-SCNC: 106 MMOL/L (ref 94–109)
CHOLEST SERPL-MCNC: 228 MG/DL
CO2 SERPL-SCNC: 28 MMOL/L (ref 20–32)
CREAT SERPL-MCNC: 0.92 MG/DL (ref 0.66–1.25)
GFR SERPL CREATININE-BSD FRML MDRD: >90 ML/MIN/{1.73_M2}
GLUCOSE SERPL-MCNC: 114 MG/DL (ref 70–99)
HDLC SERPL-MCNC: 47 MG/DL
LDLC SERPL CALC-MCNC: 147 MG/DL
NONHDLC SERPL-MCNC: 181 MG/DL
POTASSIUM SERPL-SCNC: 4.6 MMOL/L (ref 3.4–5.3)
PROT SERPL-MCNC: 7.8 G/DL (ref 6.8–8.8)
SODIUM SERPL-SCNC: 141 MMOL/L (ref 133–144)
TRIGL SERPL-MCNC: 168 MG/DL

## 2019-06-14 PROCEDURE — 80061 LIPID PANEL: CPT | Performed by: FAMILY MEDICINE

## 2019-06-14 PROCEDURE — 36415 COLL VENOUS BLD VENIPUNCTURE: CPT | Performed by: FAMILY MEDICINE

## 2019-06-14 PROCEDURE — 80053 COMPREHEN METABOLIC PANEL: CPT | Performed by: FAMILY MEDICINE

## 2019-06-14 PROCEDURE — 87591 N.GONORRHOEAE DNA AMP PROB: CPT | Performed by: FAMILY MEDICINE

## 2019-06-14 PROCEDURE — 87491 CHLMYD TRACH DNA AMP PROBE: CPT | Performed by: FAMILY MEDICINE

## 2019-06-14 PROCEDURE — 87389 HIV-1 AG W/HIV-1&-2 AB AG IA: CPT | Performed by: FAMILY MEDICINE

## 2019-06-16 LAB
C TRACH DNA SPEC QL NAA+PROBE: NEGATIVE
N GONORRHOEA DNA SPEC QL NAA+PROBE: NEGATIVE
SPECIMEN SOURCE: NORMAL
SPECIMEN SOURCE: NORMAL

## 2019-06-17 LAB — HIV 1+2 AB+HIV1 P24 AG SERPL QL IA: NONREACTIVE

## 2019-11-08 ENCOUNTER — ANCILLARY PROCEDURE (OUTPATIENT)
Dept: GENERAL RADIOLOGY | Facility: CLINIC | Age: 34
End: 2019-11-08
Attending: ORTHOPAEDIC SURGERY
Payer: COMMERCIAL

## 2019-11-08 ENCOUNTER — OFFICE VISIT (OUTPATIENT)
Dept: ORTHOPEDICS | Facility: CLINIC | Age: 34
End: 2019-11-08
Payer: COMMERCIAL

## 2019-11-08 ENCOUNTER — TELEPHONE (OUTPATIENT)
Dept: ORTHOPEDICS | Facility: CLINIC | Age: 34
End: 2019-11-08

## 2019-11-08 VITALS
DIASTOLIC BLOOD PRESSURE: 68 MMHG | SYSTOLIC BLOOD PRESSURE: 108 MMHG | BODY MASS INDEX: 25.38 KG/M2 | HEIGHT: 72 IN | WEIGHT: 187.4 LBS

## 2019-11-08 DIAGNOSIS — M25.549 PAIN IN JOINT, HAND: ICD-10-CM

## 2019-11-08 DIAGNOSIS — M20.011 MALLET FINGER OF RIGHT HAND: Primary | ICD-10-CM

## 2019-11-08 PROCEDURE — 73120 X-RAY EXAM OF HAND: CPT | Mod: 52 | Performed by: ORTHOPAEDIC SURGERY

## 2019-11-08 PROCEDURE — 99203 OFFICE O/P NEW LOW 30 MIN: CPT | Performed by: ORTHOPAEDIC SURGERY

## 2019-11-08 PROCEDURE — 73130 X-RAY EXAM OF HAND: CPT | Mod: RT | Performed by: ORTHOPAEDIC SURGERY

## 2019-11-08 ASSESSMENT — MIFFLIN-ST. JEOR: SCORE: 1827.48

## 2019-11-08 NOTE — TELEPHONE ENCOUNTER
Scheduled surgery    Type of surgery: ORIF of distal phalax, right little finger (CPT [id number])   Location of surgery: Other: [facility]  Date and time of surgery: 11/11/19 @ 3pm   Surgeon: Rossy   Pre-Op Appt Date: local anesthesia  Post-Op Appt Date: 11/25/19   Packet sent out: Yes  Pre-cert/Authorization completed:  No  Date: 11/8/19      Kavin Guthrie, Surgery Scheduler

## 2019-11-08 NOTE — PROGRESS NOTES
HISTORY OF PRESENT ILLNESS:    Wilmer Crenshaw is a 34 year old male who is seen as self referral for right pinkie DIP pain.  He was cleaning out a closet, and the bifold door pinched his finger.  Injury occurred approximately 4 weeks ago.  Patient is right hand dominant. Patient works as a , and finds increased pain with driving truck.  Present symptoms: right pinkie DIP joint tenderness, swelling. Unable to extend finger actively.  Patient states that at the time of injury there was a small blood blister that he was able to release.  No clicking or sheering pains. difficulties with gripping and grasping.   He denies any numbness or tingling sensation in the hand.  He is right-hand dominant.  Current pain level: 0/10,  Worst pain level: 5/10   Treatments tried to this point: splint from [facility] with little relief, and had difficulties completing his daily duties.   No use of ice, OTC Tylenol or Ibuprofen.  Orthopedic PMH: none     Past Medical History:   Diagnosis Date     Gastritis        No past surgical history on file.    Family History   Problem Relation Age of Onset     Family History Negative Mother      Family History Negative Father        Social History     Socioeconomic History     Marital status: Single     Spouse name: Not on file     Number of children: Not on file     Years of education: Not on file     Highest education level: Not on file   Occupational History     Not on file   Social Needs     Financial resource strain: Not on file     Food insecurity:     Worry: Not on file     Inability: Not on file     Transportation needs:     Medical: Not on file     Non-medical: Not on file   Tobacco Use     Smoking status: Never Smoker     Smokeless tobacco: Never Used   Substance and Sexual Activity     Alcohol use: No     Frequency: Never     Drug use: No     Sexual activity: Not Currently     Partners: Female     Comment: judit in [facility]    Lifestyle     Physical activity:     Days per week:  "Not on file     Minutes per session: Not on file     Stress: Not on file   Relationships     Social connections:     Talks on phone: Not on file     Gets together: Not on file     Attends Caodaism service: Not on file     Active member of club or organization: Not on file     Attends meetings of clubs or organizations: Not on file     Relationship status: Not on file     Intimate partner violence:     Fear of current or ex partner: Not on file     Emotionally abused: Not on file     Physically abused: Not on file     Forced sexual activity: Not on file   Other Topics Concern     Not on file   Social History Narrative     Not on file       Current Outpatient Medications   Medication Sig Dispense Refill     ranitidine (ZANTAC) 150 MG capsule Take 1 capsule (150 mg) by mouth 2 times daily (Patient not taking: Reported on 6/13/2019) 180 capsule 3       Allergies   Allergen Reactions     Seasonal Allergies        REVIEW OF SYSTEMS:  CONSTITUTIONAL:  NEGATIVE for fever, chills, change in weight  INTEGUMENTARY/SKIN:  NEGATIVE for worrisome rashes, moles or lesions  EYES:  NEGATIVE for vision changes or irritation  ENT/MOUTH:  NEGATIVE for ear, mouth and throat problems  RESP:  NEGATIVE for significant cough or SOB  BREAST:  NEGATIVE for masses, tenderness or discharge  CV:  NEGATIVE for chest pain, palpitations or peripheral edema  GI:  NEGATIVE for nausea, abdominal pain, heartburn, or change in bowel habits  :  Negative   MUSCULOSKELETAL:  See HPI above  NEURO:  NEGATIVE for weakness, dizziness or paresthesias  ENDOCRINE:  NEGATIVE for temperature intolerance, skin/hair changes  HEME/ALLERGY/IMMUNE:  NEGATIVE for bleeding problems  PSYCHIATRIC:  NEGATIVE for changes in mood or affect      PHYSICAL EXAM:  /68 (BP Location: Right arm, Patient Position: Chair, Cuff Size: Adult Large)   Ht 1.82 m (5' 11.65\")   Wt 85 kg (187 lb 6.4 oz)   BMI 25.66 kg/m    Body mass index is 25.66 kg/m .   GENERAL APPEARANCE: " healthy, alert and no distress   HEENT: No apparent thyroid megaly. Clear sclera with normal ocular movement  RESPIRATORY: No labored breathing  SKIN: no suspicious lesions or rashes  NEURO: Normal strength and tone, mentation intact and speech normal  VASCULAR: Good pulses, and capillary refill   LYMPH: no lymphadenopathy   PSYCH:  mentation appears normal and affect normal/bright    MUSCULOSKELETAL:  Flexion contracture position of the DIP joint, right little finger  Flexion is present however not the extension at the DIP joint  No rotational deformity noted  The rest of the hand examination is intact  Circulation is intact  Sensation is intact  No swelling is noted  Minimal pain at the DIP joint of the right little finger      ASSESSMENT:  Large bony mallet finger, chronic      PLAN:  We obtained x-rays of the right hand.  Rather significantly displaced distal phalangeal fracture at the DIP joint is noted.  Because of the force from the extensor and flexor there is a significant gap between the fragments at this point.  We obtained another lateral view after a stack splint was applied.  Some improvement was noted but still has a large gap through which the distal end of middle phalanx is pushing through.  This is not felt to be a satisfactory condition and very likely turn into a arthritic condition for lack of articular surface contact.  Even though it would be very difficult, surgical intervention of open reduction internal fixation may provide better alignment and reduce the risk of future issues such as posttraumatic arthritis.  He understands the details of the complex nature from this injury being chronic and immobilization for 6 weeks following the operation.  We will try to get him into the surgical suite as soon as possible for open reduction internal fixation of the distal phalanx.          Imaging Interpretation:   Large bony mallet deformity with significant displacement of the distal phalanx, right  little finger.  Some improvement but not enough improvement is noted with extension immobilization of the DIP joint.        Savage Blair MD  Department of Orthopedic Surgery        Disclaimer: This note consists of symbols derived from keyboarding, dictation and/or voice recognition software. As a result, there may be errors in the script that have gone undetected. Please consider this when interpreting information found in this chart.

## 2019-11-08 NOTE — LETTER
11/8/2019         RE: Wilmer Crenshaw  [address]  [place] [address]        Dear Colleague,    Thank you for referring your patient, Wilmer Crenshaw, to the [facility]. Please see a copy of my visit note below.    HISTORY OF PRESENT ILLNESS:    Wilmer Crenshaw is a 34 year old male who is seen as self referral for right pinkie DIP pain.  He was cleaning out a closet, and the bifold door pinched his finger.  Injury occurred approximately 4 weeks ago.  Patient is right hand dominant. Patient works as a , and finds increased pain with driving truck.  Present symptoms: right pinkie DIP joint tenderness, swelling. Unable to extend finger actively.  Patient states that at the time of injury there was a small blood blister that he was able to release.  No clicking or sheering pains. difficulties with gripping and grasping.   He denies any numbness or tingling sensation in the hand.  He is right-hand dominant.  Current pain level: 0/10,  Worst pain level: 5/10   Treatments tried to this point: splint from [facility] with little relief, and had difficulties completing his daily duties.   No use of ice, OTC Tylenol or Ibuprofen.  Orthopedic PMH: none     Past Medical History:   Diagnosis Date     Gastritis        No past surgical history on file.    Family History   Problem Relation Age of Onset     Family History Negative Mother      Family History Negative Father        Social History     Socioeconomic History     Marital status: Single     Spouse name: Not on file     Number of children: Not on file     Years of education: Not on file     Highest education level: Not on file   Occupational History     Not on file   Social Needs     Financial resource strain: Not on file     Food insecurity:     Worry: Not on file     Inability: Not on file     Transportation needs:     Medical: Not on file     Non-medical: Not on file   Tobacco Use     Smoking status: Never Smoker     Smokeless  tobacco: Never Used   Substance and Sexual Activity     Alcohol use: No     Frequency: Never     Drug use: No     Sexual activity: Not Currently     Partners: Female     Comment: fiance in [facility]    Lifestyle     Physical activity:     Days per week: Not on file     Minutes per session: Not on file     Stress: Not on file   Relationships     Social connections:     Talks on phone: Not on file     Gets together: Not on file     Attends Nondenominational service: Not on file     Active member of club or organization: Not on file     Attends meetings of clubs or organizations: Not on file     Relationship status: Not on file     Intimate partner violence:     Fear of current or ex partner: Not on file     Emotionally abused: Not on file     Physically abused: Not on file     Forced sexual activity: Not on file   Other Topics Concern     Not on file   Social History Narrative     Not on file       Current Outpatient Medications   Medication Sig Dispense Refill     ranitidine (ZANTAC) 150 MG capsule Take 1 capsule (150 mg) by mouth 2 times daily (Patient not taking: Reported on 6/13/2019) 180 capsule 3       Allergies   Allergen Reactions     Seasonal Allergies        REVIEW OF SYSTEMS:  CONSTITUTIONAL:  NEGATIVE for fever, chills, change in weight  INTEGUMENTARY/SKIN:  NEGATIVE for worrisome rashes, moles or lesions  EYES:  NEGATIVE for vision changes or irritation  ENT/MOUTH:  NEGATIVE for ear, mouth and throat problems  RESP:  NEGATIVE for significant cough or SOB  BREAST:  NEGATIVE for masses, tenderness or discharge  CV:  NEGATIVE for chest pain, palpitations or peripheral edema  GI:  NEGATIVE for nausea, abdominal pain, heartburn, or change in bowel habits  :  Negative   MUSCULOSKELETAL:  See HPI above  NEURO:  NEGATIVE for weakness, dizziness or paresthesias  ENDOCRINE:  NEGATIVE for temperature intolerance, skin/hair changes  HEME/ALLERGY/IMMUNE:  NEGATIVE for bleeding problems  PSYCHIATRIC:  NEGATIVE for changes in  "mood or affect      PHYSICAL EXAM:  /68 (BP Location: Right arm, Patient Position: Chair, Cuff Size: Adult Large)   Ht 1.82 m (5' 11.65\")   Wt 85 kg (187 lb 6.4 oz)   BMI 25.66 kg/m     Body mass index is 25.66 kg/m .   GENERAL APPEARANCE: healthy, alert and no distress   HEENT: No apparent thyroid megaly. Clear sclera with normal ocular movement  RESPIRATORY: No labored breathing  SKIN: no suspicious lesions or rashes  NEURO: Normal strength and tone, mentation intact and speech normal  VASCULAR: Good pulses, and capillary refill   LYMPH: no lymphadenopathy   PSYCH:  mentation appears normal and affect normal/bright    MUSCULOSKELETAL:  Flexion contracture position of the DIP joint, right little finger  Flexion is present however not the extension at the DIP joint  No rotational deformity noted  The rest of the hand examination is intact  Circulation is intact  Sensation is intact  No swelling is noted  Minimal pain at the DIP joint of the right little finger      ASSESSMENT:  Large bony mallet finger, chronic      PLAN:  We obtained x-rays of the right hand.  Rather significantly displaced distal phalangeal fracture at the DIP joint is noted.  Because of the force from the extensor and flexor there is a significant gap between the fragments at this point.  We obtained another lateral view after a stack splint was applied.  Some improvement was noted but still has a large gap through which the distal end of middle phalanx is pushing through.  This is not felt to be a satisfactory condition and very likely turn into a arthritic condition for lack of articular surface contact.  Even though it would be very difficult, surgical intervention of open reduction internal fixation may provide better alignment and reduce the risk of future issues such as posttraumatic arthritis.  He understands the details of the complex nature from this injury being chronic and immobilization for 6 weeks following the " operation.  We will try to get him into the surgical suite as soon as possible for open reduction internal fixation of the distal phalanx.          Imaging Interpretation:   Large bony mallet deformity with significant displacement of the distal phalanx, right little finger.  Some improvement but not enough improvement is noted with extension immobilization of the DIP joint.        Savage Blair MD  Department of Orthopedic Surgery        Disclaimer: This note consists of symbols derived from keyboarding, dictation and/or voice recognition software. As a result, there may be errors in the script that have gone undetected. Please consider this when interpreting information found in this chart.      Again, thank you for allowing me to participate in the care of your patient.        Sincerely,        Savage Blair MD

## 2019-11-11 ENCOUNTER — TRANSFERRED RECORDS (OUTPATIENT)
Dept: HEALTH INFORMATION MANAGEMENT | Facility: CLINIC | Age: 34
End: 2019-11-11

## 2019-11-25 ENCOUNTER — OFFICE VISIT (OUTPATIENT)
Dept: ORTHOPEDICS | Facility: CLINIC | Age: 34
End: 2019-11-25
Payer: COMMERCIAL

## 2019-11-25 DIAGNOSIS — Z47.89 ORTHOPEDIC AFTERCARE: Primary | ICD-10-CM

## 2019-11-25 PROCEDURE — 99024 POSTOP FOLLOW-UP VISIT: CPT | Performed by: PHYSICIAN ASSISTANT

## 2019-11-25 NOTE — PATIENT INSTRUCTIONS
Maintain protection over the pin with a splint full time other than hygiene.    Keep pin site dry.    Follow up in 4 weeks.

## 2019-11-25 NOTE — PROGRESS NOTES
HISTORY OF PRESENT ILLNESS:    Wilmer Crenshaw is a 34 year old male who is seen in follow up for Right little finger Mallet fixation with anchor and K wire, DOS 11/11/2019, Dr. Blair.  Present symptoms: pt doing well, maintained splint.  No concerns.  Denies Chest pain, Calve pain, Fever, Chills.    Current Treatment: postop, splint, pin.    PHYSICAL EXAM:  There were no vitals taken for this visit.  There is no height or weight on file to calculate BMI.   GENERAL APPEARANCE: healthy, alert and no distress   PSYCH:  mentation appears normal and affect normal/bright    MSK:  Right little finger.   Presents in maintained splint..  Ambulates: WNG.  Incision clean and dry, Sutures and Pin x 1 present, healing.  Appropriate incisional erythema.   No Ecchymosis.  Edema min at digit.  CMS: estefania incisional numbness, otherwise grossly intact.  AROM deferred.      IMAGING INTERPRETATION:  None today.  Images read and interpreted by me.     ASSESSMENT:  Wilmer Crenshaw is a 34 year old male S/P Right little finger Mallet fixation with anchor and K wire, DOS 11/11/2019, Dr. Blair.    Doing well, pin intact, skin healing.    PLAN:  - Surgery discussed, images reviewed if applicable, and all questions were answered at this time.  - sutures removed with sterile technique, steri-strips applied in usual fashion, care instructions given and verbally acknowledged.  - Splint applied and care discussed, full time except hygiene.  - Keep pin dry.  - limit grasping.    Return to clinic 4, weeks for pin removal.    Edgardo Cheek PA-C    Dept. Orthopedic Surgery  [facility]   11/25/2019

## 2019-12-20 ENCOUNTER — ANCILLARY PROCEDURE (OUTPATIENT)
Dept: GENERAL RADIOLOGY | Facility: CLINIC | Age: 34
End: 2019-12-20
Attending: PHYSICIAN ASSISTANT
Payer: COMMERCIAL

## 2019-12-20 ENCOUNTER — OFFICE VISIT (OUTPATIENT)
Dept: ORTHOPEDICS | Facility: CLINIC | Age: 34
End: 2019-12-20
Payer: COMMERCIAL

## 2019-12-20 DIAGNOSIS — Z47.89 ORTHOPEDIC AFTERCARE: Primary | ICD-10-CM

## 2019-12-20 DIAGNOSIS — M20.011 MALLET FINGER OF RIGHT HAND: ICD-10-CM

## 2019-12-20 PROCEDURE — 73140 X-RAY EXAM OF FINGER(S): CPT | Mod: RT | Performed by: ORTHOPAEDIC SURGERY

## 2019-12-20 PROCEDURE — 99024 POSTOP FOLLOW-UP VISIT: CPT | Performed by: PHYSICIAN ASSISTANT

## 2019-12-20 NOTE — PROGRESS NOTES
HISTORY OF PRESENT ILLNESS:    Wilmer Crenshaw is a 34 year old male who is seen in follow up for right little finger oRIF for mallet finger, DOS 11/11/2019, Dr. Blair.  Present symptoms: Pt states pain improved.  Using finger splint and monika taping.  No issues.  Denies Chest pain, Calve pain, Fever, Chills.    Current Treatment: postop, splinting.    PHYSICAL EXAM:  There were no vitals taken for this visit.  There is no height or weight on file to calculate BMI.   GENERAL APPEARANCE: healthy, alert and no distress   PSYCH:  mentation appears normal and affect normal/bright    MSK:  Right: Little finger. .  Incision clean and dry, healing.  Appropriate incisional erythema.   No Ecchymosis.  Edema min at digit.  CMS: estefania incisional numbness, otherwise grossly intact.  AROM Differed at DIP, MP and PIP WNL.      IMAGING INTERPRETATION:  xr right little finger.    Recent Results (from the past 24 hour(s))   XR Finger Right G/E 2 Views    Narrative    History: Follow-up of open reduction and internal fixation of distal   phalangeal fracture at the base, November 11, 2019.  Impression: Right little finger x-rays demonstrate presence of a K wire   through the distal interphalangeal joint of the little finger.  There is   also a metallic anchor in the proximal portion of the distal phalanx.  The   dorsal fragment of the distal phalanx has gone back to the original   position prior to the surgery in question indicating failure of the   internal fixation.  No other interval changes are noted.     Dr. Savage Blair MD  12/20/2019       ASSESSMENT:  Wilmer Crenshaw is a 34 year old male S/P oRIF for mallet finger, DOS 11/11/2019, Dr. Blair.  .  Probable fixation failure to some degree.  Dr. Blair discussed fibrous healing may lead to function, vs need for DIP fusion in future.    PLAN:  - Stax splint 4 weeks.    Return to clinic 4, weeks    Edgardo Cheek PA-C    Dept. Orthopedic Surgery  [facility]   12/20/2019

## 2020-02-03 ENCOUNTER — HOSPITAL ENCOUNTER (EMERGENCY)
Facility: CLINIC | Age: 35
Discharge: HOME | End: 2020-02-03
Attending: EMERGENCY MEDICINE | Admitting: EMERGENCY MEDICINE
Payer: COMMERCIAL

## 2020-02-03 VITALS
HEIGHT: 61 IN | TEMPERATURE: 98.8 F | OXYGEN SATURATION: 98 % | DIASTOLIC BLOOD PRESSURE: 74 MMHG | RESPIRATION RATE: 16 BRPM | WEIGHT: 190 LBS | HEART RATE: 90 BPM | BODY MASS INDEX: 35.87 KG/M2 | SYSTOLIC BLOOD PRESSURE: 123 MMHG

## 2020-02-03 DIAGNOSIS — J11.1 INFLUENZA-LIKE ILLNESS: ICD-10-CM

## 2020-02-03 LAB
DEPRECATED S PYO AG THROAT QL EIA: NORMAL
FLUAV+FLUBV AG SPEC QL: NEGATIVE
FLUAV+FLUBV AG SPEC QL: NEGATIVE
SPECIMEN SOURCE: NORMAL
SPECIMEN SOURCE: NORMAL

## 2020-02-03 PROCEDURE — 87081 CULTURE SCREEN ONLY: CPT | Performed by: EMERGENCY MEDICINE

## 2020-02-03 PROCEDURE — 25000132 ZZH RX MED GY IP 250 OP 250 PS 637: Performed by: EMERGENCY MEDICINE

## 2020-02-03 PROCEDURE — 99283 EMERGENCY DEPT VISIT LOW MDM: CPT

## 2020-02-03 PROCEDURE — 87880 STREP A ASSAY W/OPTIC: CPT | Performed by: EMERGENCY MEDICINE

## 2020-02-03 PROCEDURE — 87804 INFLUENZA ASSAY W/OPTIC: CPT | Performed by: EMERGENCY MEDICINE

## 2020-02-03 RX ORDER — IBUPROFEN 600 MG/1
600 TABLET, FILM COATED ORAL ONCE
Status: COMPLETED | OUTPATIENT
Start: 2020-02-03 | End: 2020-02-03

## 2020-02-03 RX ORDER — ACETAMINOPHEN 500 MG
1000 TABLET ORAL ONCE
Status: COMPLETED | OUTPATIENT
Start: 2020-02-03 | End: 2020-02-03

## 2020-02-03 RX ADMIN — IBUPROFEN 600 MG: 600 TABLET ORAL at 09:55

## 2020-02-03 RX ADMIN — ACETAMINOPHEN 1000 MG: 500 TABLET, FILM COATED ORAL at 09:55

## 2020-02-03 ASSESSMENT — ENCOUNTER SYMPTOMS
DIARRHEA: 0
VOMITING: 0
APPETITE CHANGE: 0
WEAKNESS: 1
COUGH: 1
CHILLS: 1
ARTHRALGIAS: 1
SORE THROAT: 1
FEVER: 1

## 2020-02-03 ASSESSMENT — MIFFLIN-ST. JEOR: SCORE: 1670.29

## 2020-02-03 NOTE — ED PROVIDER NOTES
"  History     Chief Complaint:  Flu Like Symptoms    HPI   Wilmer Crenshaw is an otherwise healthy 35 year old male who presents with flu like symptoms. He states that yesterday around 1500 he began to have a sore throat, joint pain, chills, and subjective fever. He states that he began to have a nonproductive cough around 1700 and felt generally weak. He denies any rash, vomiting, or diarrhea. He is eating ok. The patient has been taking DayQuil and NyQuil to help his symptoms but still feels ill therefore presenting today. Of note, he did come into contact with a friend 2 weeks ago who did have a cough.     Allergies:  No known drug allergies.      Medications:    Zantac    Past Medical History:    Gastritis   Seasonal allergies     Past Surgical History:    Past surgical history reviewed. No pertinent past surgical history.     Family History:    Family history reviewed. No pertinent family history.     Social History:  Smoking Status: Never Smoker  Smokeless Tobacco: Never Used  Alcohol Use: Negative   Drug Use: Negative  PCP: Phillip Harper   Marital Status:  Single      Review of Systems   Constitutional: Positive for chills and fever. Negative for appetite change.   HENT: Positive for sore throat.    Respiratory: Positive for cough.    Gastrointestinal: Negative for diarrhea and vomiting.   Musculoskeletal: Positive for arthralgias.   Skin: Negative for rash.   Neurological: Positive for weakness.   All other systems reviewed and are negative.    Physical Exam     Patient Vitals for the past 24 hrs:   BP Temp Temp src Pulse Resp SpO2 Height Weight   02/03/20 0927 123/74 98.8  F (37.1  C) Temporal 90 16 98 % 1.558 m (5' 1.32\") 86.2 kg (190 lb)      Physical Exam    Physical Exam   Constitutional:  Patient is oriented to person, place, and time. They appear well-developed and well-nourished. Mild distress secondary to flu symptoms.    HENT:   Mouth/Throat:   Oropharynx is clear and moist.   Eyes:    Conjunctivae normal " and EOM are normal. Pupils are equal, round, and reactive to light.   Neck:    Normal range of motion. no adenopathy and no meningeal signs.   Cardiovascular: Normal rate, regular rhythm and normal heart sounds.  Exam reveals no gallop and no friction rub.  No murmur heard.  Pulmonary/Chest:  Effort normal and breath sounds normal. Patient has no wheezes. Patient has no rales.   Abdominal:   Soft. Bowel sounds are normal. Patient exhibits no mass. There is no tenderness. There is no rebound and no guarding.   Musculoskeletal:  Normal range of motion. Patient exhibits no edema.   Neurological:   Patient is alert and oriented to person, place, and time. Patient has normal strength. No cranial nerve deficit or sensory deficit. GCS 15  Skin:   Skin is warm and dry. No rash noted. No erythema.   Psychiatric:   Patient has a normal mood and affect. Patient's behavior is normal. Judgment and thought content normal.     Emergency Department Course     Laboratory:  Laboratory findings were communicated with the patient who voiced understanding of the findings.    Rapid Strep Screen: Negative   Beta Strep Group A Culture: Pending     Influenza A/B antigen: Negative     Interventions:  0955 Tylenol 1000 mg PO  0955 Advil 600 mg PO     Emergency Department Course:    Nursing notes and vitals reviewed. I performed an exam of the patient as documented above.     0955 A nasal and throat swab sample was obtained for laboratory testing as documented above.     1105 Prior to discharge, I personally reviewed the results with the patient and all related questions were answered. The patient verbalized understanding and is amenable to plan.     Impression & Plan      Medical Decision Making:   Wilmer Crenshaw is a 35 year old male who presents for evaluation of cough, fever and myalgias.  This is consistent with an influenza like illness. Patient understands the sensitivity of influenza rapid test. They are at risk for pneumonia but no signs  of this are detected on today's visit, therefore xray was not indicated.  Close followup of primary care physician is indicated and return to the ED for high fevers > 103 for more than 48 hours more, increasing productive cough, shortness of breath, or confusion.  There is no signs of serious bacterial infection such as bacteremia, meningitis, UTI/pyelonephritis, strep pharyngitis, etc.      Diagnosis:    ICD-10-CM    1. Influenza-like illness R69      Disposition:   The patient is discharged to home.     Discharge Medications:  No discharge medications.     Scribe Disclosure:  I, Orla Severson, am serving as a scribe at 9:43 AM on 2/3/2020 to document services personally performed by Isamar Ricks MD based on my observations and the provider's statements to me.    EMERGENCY DEPARTMENT       Isamar Ricks MD  02/03/20 111

## 2020-02-03 NOTE — ED AVS SNAPSHOT
Emergency Department  [address] [facility] [address]  Phone:  [phone]  Fax:  [phone]                                    Wilmer Crenshaw   MRN: [id number]    Department:   Emergency Department   Date of Visit:  2/3/2020           After Visit Summary Signature Page    I have received my discharge instructions, and my questions have been answered. I have discussed any challenges I see with this plan with the nurse or doctor.    ..........................................................................................................................................  Patient/Patient Representative Signature      ..........................................................................................................................................  Patient Representative Print Name and Relationship to Patient    ..................................................               ................................................  Date                                   Time    ..........................................................................................................................................  Reviewed by Signature/Title    ...................................................              ..............................................  Date                                               Time          22EPIC Rev 08/18

## 2020-02-07 LAB
BACTERIA SPEC CULT: NORMAL
SPECIMEN SOURCE: NORMAL

## 2020-03-10 ENCOUNTER — HEALTH MAINTENANCE LETTER (OUTPATIENT)
Age: 35
End: 2020-03-10

## 2020-12-27 ENCOUNTER — HEALTH MAINTENANCE LETTER (OUTPATIENT)
Age: 35
End: 2020-12-27

## 2021-10-09 ENCOUNTER — HEALTH MAINTENANCE LETTER (OUTPATIENT)
Age: 36
End: 2021-10-09

## 2022-01-29 ENCOUNTER — HEALTH MAINTENANCE LETTER (OUTPATIENT)
Age: 37
End: 2022-01-29

## 2022-09-11 ENCOUNTER — HEALTH MAINTENANCE LETTER (OUTPATIENT)
Age: 37
End: 2022-09-11

## 2023-03-20 ENCOUNTER — NURSE TRIAGE (OUTPATIENT)
Dept: PEDIATRICS | Facility: CLINIC | Age: 38
End: 2023-03-20
Payer: COMMERCIAL

## 2023-03-20 ENCOUNTER — OFFICE VISIT (OUTPATIENT)
Dept: URGENT CARE | Facility: URGENT CARE | Age: 38
End: 2023-03-20
Payer: COMMERCIAL

## 2023-03-20 VITALS
OXYGEN SATURATION: 97 % | HEART RATE: 85 BPM | SYSTOLIC BLOOD PRESSURE: 137 MMHG | RESPIRATION RATE: 20 BRPM | TEMPERATURE: 98.6 F | BODY MASS INDEX: 38.14 KG/M2 | WEIGHT: 204 LBS | DIASTOLIC BLOOD PRESSURE: 79 MMHG

## 2023-03-20 DIAGNOSIS — K92.1 BLOOD IN STOOL: Primary | ICD-10-CM

## 2023-03-20 PROCEDURE — 99203 OFFICE O/P NEW LOW 30 MIN: CPT | Performed by: FAMILY MEDICINE

## 2023-03-20 ASSESSMENT — PAIN SCALES - GENERAL: PAINLEVEL: NO PAIN (0)

## 2023-03-20 NOTE — PROGRESS NOTES
Assessment & Plan     Blood in stool     Discussed internal hemorrhoid(s) most commonly cause symptoms which he reports.   Possible small anal fissure may have been present and is healing there is only a small skin erosion seen at the 5 o'clock position of the rectal tissue otherwise no abscesses or lesions were seen.  Recommending good oral hydration and intake of fiber to prevent constipation.  Additionally he was counseled to not strain while on the toilet.  If symptoms persist follow-up with doctors office or be seen by gastroenterology for consideration of colonoscopy      Deny Rebollar MD   [facility]    Salty Guillen is a 38 year old male who presents to clinic today for the following health issues:  Chief Complaint   Patient presents with     Urgent Care     Rectal Bleeding     Patient is here for rectal bleeding (x 2 days)     HPI    Patient notes that in his stools he has had blood for couple days has never had internal hemorrhoids before.  There is no pain with sitting down.  There was a little bit of slight discomfort when touching the rectal area which his wife looked at and some possibly a small cut.  No early family history of colon cancer.     Patient also notes that he returned from [facility] couple weeks ago him and his wife did see pinworms in their stool they took over-the-counter medication after 1 dose both of them saw a resolution of symptoms.          There are no problems to display for this patient.    Current Outpatient Medications   Medication     ranitidine (ZANTAC) 150 MG capsule     No current facility-administered medications for this visit.         Objective    /79 (BP Location: Right arm, Patient Position: Sitting, Cuff Size: Adult Regular)   Pulse 85   Temp 98.6  F (37  C) (Oral)   Resp 20   Wt 92.5 kg (204 lb)   SpO2 97%   BMI 38.14 kg/m    Physical Exam     Exam as noted above -- no gluteal tissue swelling or lesions    No results found for any  visits on 03/20/23.              The use of Dragon/[employer] services may have been used to construct the content in this note; any grammatical or spelling errors are non-intentional. Please contact the author of this note directly if you are in need of any clarification.

## 2023-03-20 NOTE — TELEPHONE ENCOUNTER
"S-(situation): Patient and patient's wife calling regarding rectal bleeding and small tear seen near anus.     B-(background): Patient provided background regarding symptoms: A week prior, \"started at first with diarrhea BMs, had diarrhea for a couple days, had a spicy sandwich, felt burning sensation when having BMs, also had pinworms, took medication purchased on [employer], itchiness stopped but then started experiencing blood in rectum, there is a small tear near rectum\".    A-(assessment): Patient experienced bright red blood with small blood clots passing a few time. Last had bleeding yesterday morning, and 2 nights ago. Small amount of blood, mainly passed separately from stool. No abdominal pain. No vomiting. No dizziness. No fever.     R-(recommendations): RN advised per protocol to be seen in UC today. No available appointments left today in clinic. Patient verbalized understanding and agreed with plan.     Reason for Disposition    MODERATE rectal bleeding (small blood clots, passing blood without stool, or toilet water turns red)    Additional Information    Negative: SEVERE abdominal pain (e.g., excruciating)    Negative: Constant abdominal pain lasting > 2 hours    Negative: Pale skin (pallor) of new-onset or worsening    Negative: Patient sounds very sick or weak to the triager    Negative: SEVERE rectal bleeding (large blood clots; constant or on and off bleeding)    Negative: SEVERE dizziness (e.g., unable to stand, requires support to walk, feels like passing out now)    Negative: MODERATE rectal bleeding (small blood clots, passing blood without stool, or toilet water turns red) more than once a day    Negative: Bloody, black, or tarry bowel movements  (Exception: Chronic-unchanged black-grey bowel movements and is taking iron pills or Pepto-Bismol.)    Negative: High-risk adult (e.g., prior surgery on aorta, abdominal aortic aneurysm)    Negative: Rectal foreign body (inserted or swallowed)    " Negative: Diarrhea is main symptom    Negative: Rectal symptoms    Negative: Passed out (i.e., fainted, collapsed and was not responding)    Negative: Shock suspected (e.g., cold/pale/clammy skin, too weak to stand, low BP, rapid pulse)    Negative: Vomiting red blood or black (coffee ground) material    Negative: Sounds like a life-threatening emergency to the triager    Protocols used: RECTAL BLEEDING-A-OH    Jarocho FATIMA RN 3/20/2023 at 4:26 PM

## 2023-03-21 NOTE — PATIENT INSTRUCTIONS
Drink 80 ounces of water a day to stay hydrated      Eat both soluble and insoluble fiber to help soften your stools to prevent constipation      Avoid straining while on the toilet      If your symptoms worsen or do not improve within a week please call your doctors office for an appointment, they may refer you to have a colonoscopy done

## 2023-05-06 ENCOUNTER — HEALTH MAINTENANCE LETTER (OUTPATIENT)
Age: 38
End: 2023-05-06

## 2023-05-10 SDOH — ECONOMIC STABILITY: TRANSPORTATION INSECURITY
IN THE PAST 12 MONTHS, HAS THE LACK OF TRANSPORTATION KEPT YOU FROM MEDICAL APPOINTMENTS OR FROM GETTING MEDICATIONS?: NO

## 2023-05-10 SDOH — HEALTH STABILITY: PHYSICAL HEALTH: ON AVERAGE, HOW MANY MINUTES DO YOU ENGAGE IN EXERCISE AT THIS LEVEL?: 30 MIN

## 2023-05-10 SDOH — ECONOMIC STABILITY: TRANSPORTATION INSECURITY
IN THE PAST 12 MONTHS, HAS LACK OF TRANSPORTATION KEPT YOU FROM MEETINGS, WORK, OR FROM GETTING THINGS NEEDED FOR DAILY LIVING?: NO

## 2023-05-10 SDOH — ECONOMIC STABILITY: INCOME INSECURITY: IN THE LAST 12 MONTHS, WAS THERE A TIME WHEN YOU WERE NOT ABLE TO PAY THE MORTGAGE OR RENT ON TIME?: PATIENT REFUSED

## 2023-05-10 SDOH — ECONOMIC STABILITY: INCOME INSECURITY: HOW HARD IS IT FOR YOU TO PAY FOR THE VERY BASICS LIKE FOOD, HOUSING, MEDICAL CARE, AND HEATING?: NOT HARD AT ALL

## 2023-05-10 SDOH — ECONOMIC STABILITY: FOOD INSECURITY: WITHIN THE PAST 12 MONTHS, THE FOOD YOU BOUGHT JUST DIDN'T LAST AND YOU DIDN'T HAVE MONEY TO GET MORE.: NEVER TRUE

## 2023-05-10 SDOH — HEALTH STABILITY: PHYSICAL HEALTH: ON AVERAGE, HOW MANY DAYS PER WEEK DO YOU ENGAGE IN MODERATE TO STRENUOUS EXERCISE (LIKE A BRISK WALK)?: 1 DAY

## 2023-05-10 SDOH — ECONOMIC STABILITY: FOOD INSECURITY: WITHIN THE PAST 12 MONTHS, YOU WORRIED THAT YOUR FOOD WOULD RUN OUT BEFORE YOU GOT MONEY TO BUY MORE.: NEVER TRUE

## 2023-05-10 ASSESSMENT — SOCIAL DETERMINANTS OF HEALTH (SDOH)
IN A TYPICAL WEEK, HOW MANY TIMES DO YOU TALK ON THE PHONE WITH FAMILY, FRIENDS, OR NEIGHBORS?: MORE THAN THREE TIMES A WEEK
HOW OFTEN DO YOU GET TOGETHER WITH FRIENDS OR RELATIVES?: ONCE A WEEK
DO YOU BELONG TO ANY CLUBS OR ORGANIZATIONS SUCH AS CHURCH GROUPS UNIONS, FRATERNAL OR ATHLETIC GROUPS, OR SCHOOL GROUPS?: YES
HOW OFTEN DO YOU ATTEND CHURCH OR RELIGIOUS SERVICES?: MORE THAN 4 TIMES PER YEAR

## 2023-05-10 ASSESSMENT — LIFESTYLE VARIABLES
HOW OFTEN DO YOU HAVE SIX OR MORE DRINKS ON ONE OCCASION: NEVER
HOW OFTEN DO YOU HAVE A DRINK CONTAINING ALCOHOL: NEVER
HOW MANY STANDARD DRINKS CONTAINING ALCOHOL DO YOU HAVE ON A TYPICAL DAY: PATIENT DOES NOT DRINK
SKIP TO QUESTIONS 9-10: 1
AUDIT-C TOTAL SCORE: 0

## 2023-05-10 ASSESSMENT — ENCOUNTER SYMPTOMS
HEMATOCHEZIA: 1
SHORTNESS OF BREATH: 0
PARESTHESIAS: 0
NERVOUS/ANXIOUS: 0
SORE THROAT: 0
HEADACHES: 0
EYE PAIN: 0
COUGH: 0
HEARTBURN: 1
DIZZINESS: 0
WEAKNESS: 0
ABDOMINAL PAIN: 0
NAUSEA: 0
ARTHRALGIAS: 0
FREQUENCY: 0
CHILLS: 0
JOINT SWELLING: 0
CONSTIPATION: 0
DIARRHEA: 0
PALPITATIONS: 0
FEVER: 0
HEMATURIA: 0
DYSURIA: 0
MYALGIAS: 0

## 2023-05-11 ENCOUNTER — OFFICE VISIT (OUTPATIENT)
Dept: PEDIATRICS | Facility: CLINIC | Age: 38
End: 2023-05-11
Payer: COMMERCIAL

## 2023-05-11 VITALS
SYSTOLIC BLOOD PRESSURE: 110 MMHG | HEIGHT: 72 IN | BODY MASS INDEX: 27.83 KG/M2 | HEART RATE: 86 BPM | DIASTOLIC BLOOD PRESSURE: 70 MMHG | RESPIRATION RATE: 18 BRPM | WEIGHT: 205.5 LBS | TEMPERATURE: 97.6 F | OXYGEN SATURATION: 100 %

## 2023-05-11 DIAGNOSIS — Z00.00 ROUTINE GENERAL MEDICAL EXAMINATION AT A HEALTH CARE FACILITY: Primary | ICD-10-CM

## 2023-05-11 DIAGNOSIS — Z13.220 SCREENING FOR HYPERLIPIDEMIA: ICD-10-CM

## 2023-05-11 DIAGNOSIS — Z11.59 NEED FOR HEPATITIS C SCREENING TEST: ICD-10-CM

## 2023-05-11 DIAGNOSIS — K27.9 PUD (PEPTIC ULCER DISEASE): ICD-10-CM

## 2023-05-11 DIAGNOSIS — R73.03 PREDIABETES: ICD-10-CM

## 2023-05-11 DIAGNOSIS — Z11.3 ROUTINE SCREENING FOR STI (SEXUALLY TRANSMITTED INFECTION): ICD-10-CM

## 2023-05-11 LAB — HBA1C MFR BLD: 6.7 % (ref 0–5.6)

## 2023-05-11 PROCEDURE — 83036 HEMOGLOBIN GLYCOSYLATED A1C: CPT | Performed by: STUDENT IN AN ORGANIZED HEALTH CARE EDUCATION/TRAINING PROGRAM

## 2023-05-11 PROCEDURE — 36415 COLL VENOUS BLD VENIPUNCTURE: CPT | Performed by: STUDENT IN AN ORGANIZED HEALTH CARE EDUCATION/TRAINING PROGRAM

## 2023-05-11 PROCEDURE — 86803 HEPATITIS C AB TEST: CPT | Performed by: STUDENT IN AN ORGANIZED HEALTH CARE EDUCATION/TRAINING PROGRAM

## 2023-05-11 PROCEDURE — 80061 LIPID PANEL: CPT | Performed by: STUDENT IN AN ORGANIZED HEALTH CARE EDUCATION/TRAINING PROGRAM

## 2023-05-11 PROCEDURE — 80053 COMPREHEN METABOLIC PANEL: CPT | Performed by: STUDENT IN AN ORGANIZED HEALTH CARE EDUCATION/TRAINING PROGRAM

## 2023-05-11 PROCEDURE — 86780 TREPONEMA PALLIDUM: CPT | Performed by: STUDENT IN AN ORGANIZED HEALTH CARE EDUCATION/TRAINING PROGRAM

## 2023-05-11 PROCEDURE — 99385 PREV VISIT NEW AGE 18-39: CPT | Mod: GC | Performed by: STUDENT IN AN ORGANIZED HEALTH CARE EDUCATION/TRAINING PROGRAM

## 2023-05-11 RX ORDER — CETIRIZINE HYDROCHLORIDE 10 MG/1
10 TABLET ORAL DAILY
COMMUNITY

## 2023-05-11 ASSESSMENT — ENCOUNTER SYMPTOMS
COUGH: 0
CHILLS: 0
SHORTNESS OF BREATH: 0
WEAKNESS: 0
JOINT SWELLING: 0
PALPITATIONS: 0
DIARRHEA: 0
FREQUENCY: 0
NAUSEA: 0
HEADACHES: 0
HEARTBURN: 1
PARESTHESIAS: 0
SORE THROAT: 0
ABDOMINAL PAIN: 0
HEMATOCHEZIA: 1
DYSURIA: 0
EYE PAIN: 0
NERVOUS/ANXIOUS: 0
CONSTIPATION: 0
ARTHRALGIAS: 0
MYALGIAS: 0
DIZZINESS: 0
FEVER: 0
HEMATURIA: 0

## 2023-05-11 ASSESSMENT — PAIN SCALES - GENERAL: PAINLEVEL: NO PAIN (0)

## 2023-05-11 NOTE — PROGRESS NOTES
SUBJECTIVE:   CC: Wilmer is an 38 year old who presents for preventative health visit.     38-year-old male past medical significant for prediabetes last known hemoglobin A1c of approximately 6.0, living intermittently in [place], [place], in [place].  Presenting to clinic for routine physical exam.    Patient originally from [facility] he had moved to [place] and then moved to [place] for several years before recently visiting [facility] and then returning to [place].    Prediabetes  While was in [place] had a hemoglobin A1c collected which was reported at about 6.0.  He was recommended for recheck but was unable to get back in in time and that was greater than 6 months ago.  He has not noticed polydipsia polyuria or changes in energy levels or fatigue.    Hyperlipidemia  Previous history of elevated cholesterol has not had that rechecked recently no shortness of breath chest pain or palpitations.    Penile lesion  Over the past 2 to 3 years patient has developed an intermittent dry flaky patch of skin which occurs either on the glans or the shaft of his penis approximately once per year last 2 to 3 weeks and then resolves with lotion.  He is mildly painful if he left to get overly dry but no significant shooting pains or neuropathy type symptoms.  No penile discharge.  During this time his only be sexually active with his wife.  Previous STI screening prior to becoming intimate with his wife in 2019 was negative for HIV and Neisseria and chlamydia.  He has never been tested for syphilis.  No trauma to the area.  No difficulty pain.    Hematochezia/hemorrhoids  Patient previously seen in urgent care for hematochezia with concerns for potential hemorrhoids.  Was started on stool softener since that time hematochezia has resolved.  Has never had blood in the stool and has never had melena.  Has not yet tried squatty potty.    Fertility  Patient has 1 child and his wife is currently pregnant with a new child.   Intermittently there have been questions of whether or not to have semen analysis for the patient.  He was interested in pursuing this but has recently found out that his wife is pregnant.  She conceived following her condition that she had hypothyroidism.  At this time he is now not interested in pursuing semen analysis given that his wife is pregnant however would be interested in future if continued issues with conception persist.     Reflux/GERD/PUD  Patient reports intermittent history of epigastric abdominal pain.  Worse with large meals especially when lying flat at night.  Tries to avoid spicy foods as well.  Notes that while he is in small he has several other people he was living with were diagnosed with H. pylori.  He has not noticed any melena in his stools recently.  No significant nausea or emesis.        5/11/2023     2:23 PM   Additional Questions   Roomed by Vi   Accompanied by juan         5/11/2023     2:23 PM   Patient Reported Additional Medications   Patient reports taking the following new medications none   Patient has been advised of split billing requirements and indicates understanding: Yes  Healthy Habits:     Getting at least 3 servings of Calcium per day:  NO    Bi-annual eye exam:  NO    Dental care twice a year:  Yes    Sleep apnea or symptoms of sleep apnea:  Daytime drowsiness and Excessive snoring    Diet:  Regular (no restrictions)    Frequency of exercise:  1 day/week    Duration of exercise:  15-30 minutes    Taking medications regularly:  Yes    Medication side effects:  Not applicable    PHQ-2 Total Score: 0    Additional concerns today:  Yes    From [place]; was living in [facility] intermittently     to wife who is pregnant      Today's PHQ-2 Score:       5/10/2023     4:10 PM   PHQ-2 ( 1999 [employer])   Q1: Little interest or pleasure in doing things 0   Q2: Feeling down, depressed or hopeless 0   PHQ-2 Score 0   Q1: Little interest or pleasure in doing  things Not at all    Not at all   Q2: Feeling down, depressed or hopeless Not at all    Not at all   PHQ-2 Score 0    0           Social History     Tobacco Use     Smoking status: Never     Smokeless tobacco: Never   Vaping Use     Vaping status: Never Used   Substance Use Topics     Alcohol use: No             5/10/2023     4:10 PM   Alcohol Use   Prescreen: >3 drinks/day or >7 drinks/week? Not Applicable       Last PSA: No results found for: PSA    Reviewed orders with patient. Reviewed health maintenance and updated orders accordingly - Yes    Reviewed and updated as needed this visit by clinical staff   Tobacco  Allergies  Meds              Reviewed and updated as needed this visit by Provider                 Review of Systems   Constitutional: Negative for chills and fever.   HENT: Negative for congestion, ear pain, hearing loss and sore throat.    Eyes: Negative for pain and visual disturbance.   Respiratory: Negative for cough and shortness of breath.    Cardiovascular: Negative for chest pain, palpitations and peripheral edema.   Gastrointestinal: Positive for heartburn and hematochezia. Negative for abdominal pain, constipation, diarrhea and nausea.   Genitourinary: Positive for genital sores. Negative for dysuria, frequency, hematuria, impotence, penile discharge and urgency.   Musculoskeletal: Negative for arthralgias, joint swelling and myalgias.   Skin: Negative for rash.   Neurological: Negative for dizziness, weakness, headaches and paresthesias.   Psychiatric/Behavioral: Negative for mood changes. The patient is not nervous/anxious.      OBJECTIVE:   /70 (BP Location: Right arm, Patient Position: Sitting, Cuff Size: Adult Regular)   Pulse 86   Temp 97.6  F (36.4  C) (Tympanic)   Resp 18   Ht 1.829 m (6')   Wt 93.2 kg (205 lb 8 oz)   SpO2 100%   BMI 27.87 kg/m      Physical Exam  GENERAL: healthy, alert and no distress  EYES: Eyes grossly normal to inspection, PERRL and conjunctivae  and sclerae normal  HENT:  nose and mouth without ulcers or lesions  RESP: lungs clear to auscultation - no rales, rhonchi or wheezes  CV: regular rate and rhythm, normal S1 S2, no S3 or S4, no murmur, click or rub, no peripheral edema and peripheral pulses strong  ABDOMEN: soft, nontender,  bowel sounds normal  MS: no gross musculoskeletal defects noted, no edema  SKIN: no suspicious lesions or rashes  NEURO: Normal strength and tone, mentation intact and speech normal  PSYCH: mentation appears normal, affect normal/bright    ASSESSMENT/PLAN:   Wilmer was seen today for physical.    38-year-old male with past medically significant for mild hyperlipidemia as well as prediabetes seen in clinic for routine physical exam.    Diagnoses and all orders for this visit:    Routine general medical examination at a health care facility  -     Comprehensive metabolic panel (BMP + Alb, Alk Phos, ALT, AST, Total. Bili, TP); Future  -     Comprehensive metabolic panel (BMP + Alb, Alk Phos, ALT, AST, Total. Bili, TP)    Routine screening for STI (sexually transmitted infection)  Unclear what the etiology of his intermittent had on his penis which occurs once a year is.  Concern for potential penile eczema although patient does not have any symptoms today.  Alternatively could be an atypical presentation of late secondary syphilis discussed with patient plan to test for treponema today.  Low concern at this time for HSV as well there is a recurrent pattern lesions describe do not match as the and pain described does not truly fit with HSV flare.  Low concern for malignant processes spontaneously resolves.  Instructed patient to take a photo of it in the future should it recur.  Could consider topical steroids if refractory to conservative measures.  -     Treponema Abs w Reflex to RPR and Titer; Future  -     Treponema Abs w Reflex to RPR and Titer    Screening for hyperlipidemia  -     Lipid Profile (Chol, Trig, HDL, LDL calc);  Future  -     Comprehensive metabolic panel (BMP + Alb, Alk Phos, ALT, AST, Total. Bili, TP); Future  -     Lipid Profile (Chol, Trig, HDL, LDL calc)  -     Comprehensive metabolic panel (BMP + Alb, Alk Phos, ALT, AST, Total. Bili, TP)    Prediabetes  -     Hemoglobin A1c; Future  -     Comprehensive metabolic panel (BMP + Alb, Alk Phos, ALT, AST, Total. Bili, TP); Future  -     Hemoglobin A1c  -     Comprehensive metabolic panel (BMP + Alb, Alk Phos, ALT, AST, Total. Bili, TP)    PUD (peptic ulcer disease)  Reflux symptoms with known close contacts who he lives with diagnosed with H. pylori in an endemic area plan for screening.  If positive plan for treat with triple versus quadruple therapy if negative consider initiation of H2 blockade  -     Helicobacter pylori Antigen Stool; Future  -     Helicobacter pylori Antigen Stool    Need for hepatitis C screening test  -     Hepatitis C Screen Reflex to HCV RNA Quant and Genotype; Future  -     Hepatitis C Screen Reflex to HCV RNA Quant and Genotype          COUNSELING:   Reviewed preventive health counseling, as reflected in patient instructions        He reports that he has never smoked. He has never used smokeless tobacco.    Patient seen and discussed with staff attending physician Dr. Kimber Rodrigues MD  [facility]

## 2023-05-12 LAB
ALBUMIN SERPL BCG-MCNC: 4.6 G/DL (ref 3.5–5.2)
ALP SERPL-CCNC: 48 U/L (ref 40–129)
ALT SERPL W P-5'-P-CCNC: 42 U/L (ref 10–50)
ANION GAP SERPL CALCULATED.3IONS-SCNC: 13 MMOL/L (ref 7–15)
AST SERPL W P-5'-P-CCNC: 34 U/L (ref 10–50)
BILIRUB SERPL-MCNC: 0.2 MG/DL
BUN SERPL-MCNC: 14.2 MG/DL (ref 6–20)
CALCIUM SERPL-MCNC: 9.7 MG/DL (ref 8.6–10)
CHLORIDE SERPL-SCNC: 104 MMOL/L (ref 98–107)
CHOLEST SERPL-MCNC: 224 MG/DL
CREAT SERPL-MCNC: 1.12 MG/DL (ref 0.67–1.17)
DEPRECATED HCO3 PLAS-SCNC: 23 MMOL/L (ref 22–29)
GFR SERPL CREATININE-BSD FRML MDRD: 87 ML/MIN/1.73M2
GLUCOSE SERPL-MCNC: 120 MG/DL (ref 70–99)
HCV AB SERPL QL IA: NONREACTIVE
HDLC SERPL-MCNC: 47 MG/DL
LDLC SERPL CALC-MCNC: 128 MG/DL
NONHDLC SERPL-MCNC: 177 MG/DL
POTASSIUM SERPL-SCNC: 4 MMOL/L (ref 3.4–5.3)
PROT SERPL-MCNC: 7.4 G/DL (ref 6.4–8.3)
SODIUM SERPL-SCNC: 140 MMOL/L (ref 136–145)
T PALLIDUM AB SER QL: NONREACTIVE
TRIGL SERPL-MCNC: 244 MG/DL

## 2023-05-16 PROCEDURE — 87338 HPYLORI STOOL AG IA: CPT | Performed by: STUDENT IN AN ORGANIZED HEALTH CARE EDUCATION/TRAINING PROGRAM

## 2023-05-17 DIAGNOSIS — R73.03 PREDIABETES: Primary | ICD-10-CM

## 2023-05-17 LAB — H PYLORI AG STL QL IA: POSITIVE

## 2023-05-24 DIAGNOSIS — A04.8 H. PYLORI INFECTION: Primary | ICD-10-CM

## 2023-05-24 RX ORDER — BISMUTH SUBCITRATE POTASSIUM, METRONIDAZOLE AND TETRACYCLINE HYDROCHLORIDE 140; 125; 125 MG/1; MG/1; MG/1
3 CAPSULE ORAL 4 TIMES DAILY
Qty: 120 CAPSULE | Refills: 0 | Status: SHIPPED | OUTPATIENT
Start: 2023-05-24 | End: 2023-06-03

## 2024-04-11 ENCOUNTER — PATIENT OUTREACH (OUTPATIENT)
Dept: CARE COORDINATION | Facility: CLINIC | Age: 39
End: 2024-04-11
Payer: COMMERCIAL

## 2024-04-25 ENCOUNTER — PATIENT OUTREACH (OUTPATIENT)
Dept: CARE COORDINATION | Facility: CLINIC | Age: 39
End: 2024-04-25
Payer: COMMERCIAL

## 2024-07-13 ENCOUNTER — HEALTH MAINTENANCE LETTER (OUTPATIENT)
Age: 39
End: 2024-07-13

## 2025-07-19 ENCOUNTER — HEALTH MAINTENANCE LETTER (OUTPATIENT)
Age: 40
End: 2025-07-19